# Patient Record
Sex: MALE | Race: BLACK OR AFRICAN AMERICAN | URBAN - METROPOLITAN AREA
[De-identification: names, ages, dates, MRNs, and addresses within clinical notes are randomized per-mention and may not be internally consistent; named-entity substitution may affect disease eponyms.]

---

## 2018-06-04 ENCOUNTER — DOCTOR'S OFFICE (OUTPATIENT)
Dept: URBAN - METROPOLITAN AREA CLINIC 136 | Facility: CLINIC | Age: 43
Setting detail: OPHTHALMOLOGY
End: 2018-06-04
Payer: COMMERCIAL

## 2018-06-04 DIAGNOSIS — E11.9: ICD-10-CM

## 2018-06-04 DIAGNOSIS — H25.13: ICD-10-CM

## 2018-06-04 DIAGNOSIS — Z79.84: ICD-10-CM

## 2018-06-04 DIAGNOSIS — H04.123: ICD-10-CM

## 2018-06-04 PROCEDURE — 92004 COMPRE OPH EXAM NEW PT 1/>: CPT | Performed by: OPHTHALMOLOGY

## 2018-06-04 ASSESSMENT — REFRACTION_CURRENTRX
OD_OVR_VA: 20/
OS_OVR_VA: 20/
OS_OVR_VA: 20/
OD_OVR_VA: 20/
OS_OVR_VA: 20/
OD_OVR_VA: 20/

## 2018-06-04 ASSESSMENT — REFRACTION_MANIFEST
OD_VA2: 20/
OD_VA3: 20/
OS_VA2: 20/
OD_VA1: 20/
OD_VA3: 20/
OD_VA3: 20/
OU_VA: 20/
OD_VA1: 20/
OS_VA3: 20/
OS_VA1: 20/
OD_VA2: 20/
OS_VA2: 20/
OU_VA: 20/
OS_VA1: 20/
OS_VA3: 20/
OS_VA1: 20/
OD_VA1: 20/
OS_VA2: 20/
OS_VA3: 20/
OD_VA2: 20/
OU_VA: 20/

## 2018-06-04 ASSESSMENT — REFRACTION_AUTOREFRACTION
OS_SPHERE: -0.75
OS_AXIS: 180
OS_CYLINDER: -0.50
OD_CYLINDER: -0.50
OD_SPHERE: -0.50
OD_AXIS: 164

## 2018-06-04 ASSESSMENT — LID EXAM ASSESSMENTS
OS_COMMENTS: MGD
OD_COMMENTS: MGD

## 2018-06-04 ASSESSMENT — CONFRONTATIONAL VISUAL FIELD TEST (CVF)
OD_FINDINGS: FULL
OS_FINDINGS: FULL

## 2018-06-04 ASSESSMENT — VISUAL ACUITY
OD_BCVA: 20/20
OS_BCVA: 20/20

## 2018-06-04 ASSESSMENT — SUPERFICIAL PUNCTATE KERATITIS (SPK)
OS_SPK: 1+
OD_SPK: 1+

## 2018-06-04 ASSESSMENT — SPHEQUIV_DERIVED
OS_SPHEQUIV: -1
OD_SPHEQUIV: -0.75

## 2019-08-07 ENCOUNTER — DOCTOR'S OFFICE (OUTPATIENT)
Dept: URBAN - METROPOLITAN AREA CLINIC 136 | Facility: CLINIC | Age: 44
Setting detail: OPHTHALMOLOGY
End: 2019-08-07
Payer: COMMERCIAL

## 2019-08-07 DIAGNOSIS — H25.13: ICD-10-CM

## 2019-08-07 DIAGNOSIS — G45.3: ICD-10-CM

## 2019-08-07 DIAGNOSIS — H04.123: ICD-10-CM

## 2019-08-07 DIAGNOSIS — E11.9: ICD-10-CM

## 2019-08-07 DIAGNOSIS — Z79.84: ICD-10-CM

## 2019-08-07 DIAGNOSIS — H53.10: ICD-10-CM

## 2019-08-07 PROCEDURE — 92014 COMPRE OPH EXAM EST PT 1/>: CPT | Performed by: OPTOMETRIST

## 2019-08-07 ASSESSMENT — REFRACTION_AUTOREFRACTION
OD_SPHERE: -0.75
OS_SPHERE: -0.50
OD_AXIS: 172
OS_CYLINDER: -0.50
OD_CYLINDER: -1.00
OS_AXIS: 012

## 2019-08-07 ASSESSMENT — LID EXAM ASSESSMENTS
OD_COMMENTS: MGD
OS_COMMENTS: MGD

## 2019-08-07 ASSESSMENT — REFRACTION_MANIFEST
OS_VA1: 20/
OS_VA3: 20/
OU_VA: 20/
OS_VA3: 20/
OD_VA3: 20/
OD_VA1: 20/
OS_VA2: 20/
OD_VA2: 20/
OD_VA3: 20/
OU_VA: 20/
OD_VA1: 20/
OS_VA2: 20/
OD_VA2: 20/
OS_VA1: 20/

## 2019-08-07 ASSESSMENT — REFRACTION_CURRENTRX
OD_OVR_VA: 20/
OS_OVR_VA: 20/
OD_OVR_VA: 20/
OS_OVR_VA: 20/
OS_OVR_VA: 20/
OD_OVR_VA: 20/

## 2019-08-07 ASSESSMENT — SUPERFICIAL PUNCTATE KERATITIS (SPK)
OS_SPK: ABSENT
OD_SPK: ABSENT

## 2019-08-07 ASSESSMENT — VISUAL ACUITY
OD_BCVA: 20/30+1
OS_BCVA: 20/20-2

## 2019-08-07 ASSESSMENT — CONFRONTATIONAL VISUAL FIELD TEST (CVF)
OD_FINDINGS: FULL
OS_FINDINGS: FULL

## 2019-08-07 ASSESSMENT — KERATOMETRY
OS_AXISANGLE_DEGREES: 096
OD_K1POWER_DIOPTERS: 41.75
OS_K2POWER_DIOPTERS: 42.75
OS_K1POWER_DIOPTERS: 42.00
OD_K2POWER_DIOPTERS: 42.75
OD_AXISANGLE_DEGREES: 077

## 2019-08-07 ASSESSMENT — AXIALLENGTH_DERIVED
OD_AL: 24.5776
OS_AL: 24.3191

## 2019-08-07 ASSESSMENT — SPHEQUIV_DERIVED
OD_SPHEQUIV: -1.25
OS_SPHEQUIV: -0.75

## 2019-09-11 ENCOUNTER — DOCTOR'S OFFICE (OUTPATIENT)
Dept: URBAN - METROPOLITAN AREA CLINIC 136 | Facility: CLINIC | Age: 44
Setting detail: OPHTHALMOLOGY
End: 2019-09-11
Payer: COMMERCIAL

## 2019-09-11 DIAGNOSIS — E11.9: ICD-10-CM

## 2019-09-11 DIAGNOSIS — H04.123: ICD-10-CM

## 2019-09-11 DIAGNOSIS — G45.3: ICD-10-CM

## 2019-09-11 DIAGNOSIS — H53.10: ICD-10-CM

## 2019-09-11 PROBLEM — H25.13 CATARACT NUCLEAR SCLEROSIS ; BOTH EYES: Status: ACTIVE | Noted: 2018-06-04

## 2019-09-11 PROCEDURE — 92014 COMPRE OPH EXAM EST PT 1/>: CPT | Performed by: OPHTHALMOLOGY

## 2019-09-11 ASSESSMENT — REFRACTION_AUTOREFRACTION
OS_AXIS: 012
OD_SPHERE: -0.75
OS_SPHERE: -0.50
OD_AXIS: 172
OD_CYLINDER: -1.00
OS_CYLINDER: -0.50

## 2019-09-11 ASSESSMENT — CONFRONTATIONAL VISUAL FIELD TEST (CVF)
OS_FINDINGS: FULL
OD_FINDINGS: FULL

## 2019-09-11 ASSESSMENT — REFRACTION_MANIFEST
OS_VA2: 20/
OS_VA1: 20/
OS_VA3: 20/
OD_VA3: 20/
OU_VA: 20/
OD_VA2: 20/
OU_VA: 20/
OS_VA1: 20/
OD_VA1: 20/
OD_VA2: 20/
OS_VA3: 20/
OD_VA3: 20/
OS_VA2: 20/
OD_VA1: 20/

## 2019-09-11 ASSESSMENT — SUPERFICIAL PUNCTATE KERATITIS (SPK)
OD_SPK: ABSENT
OS_SPK: ABSENT

## 2019-09-11 ASSESSMENT — REFRACTION_CURRENTRX
OD_OVR_VA: 20/
OS_OVR_VA: 20/
OD_OVR_VA: 20/
OD_OVR_VA: 20/
OS_OVR_VA: 20/
OS_OVR_VA: 20/

## 2019-09-11 ASSESSMENT — SPHEQUIV_DERIVED
OD_SPHEQUIV: -1.25
OS_SPHEQUIV: -0.75

## 2019-09-11 ASSESSMENT — LID EXAM ASSESSMENTS
OS_COMMENTS: MGD
OD_COMMENTS: MGD

## 2019-09-11 ASSESSMENT — VISUAL ACUITY
OD_BCVA: 20/20-1
OS_BCVA: 20/25-2

## 2023-01-17 ENCOUNTER — OFFICE VISIT (OUTPATIENT)
Dept: URGENT CARE | Age: 48
End: 2023-01-17

## 2023-01-17 VITALS
WEIGHT: 300 LBS | SYSTOLIC BLOOD PRESSURE: 188 MMHG | TEMPERATURE: 97.7 F | HEIGHT: 70 IN | DIASTOLIC BLOOD PRESSURE: 107 MMHG | BODY MASS INDEX: 42.95 KG/M2 | HEART RATE: 97 BPM | RESPIRATION RATE: 18 BRPM | OXYGEN SATURATION: 97 %

## 2023-01-17 DIAGNOSIS — M25.519 ACUTE SHOULDER PAIN, UNSPECIFIED LATERALITY: Primary | ICD-10-CM

## 2023-01-17 RX ORDER — WARFARIN SODIUM 7.5 MG/1
7.5 TABLET ORAL DAILY
COMMUNITY
Start: 2022-12-28

## 2023-01-17 RX ORDER — KETOROLAC TROMETHAMINE 30 MG/ML
30 INJECTION, SOLUTION INTRAMUSCULAR; INTRAVENOUS ONCE
Status: COMPLETED | OUTPATIENT
Start: 2023-01-17 | End: 2023-01-17

## 2023-01-17 RX ORDER — PREDNISONE 10 MG/1
10 TABLET ORAL DAILY
Qty: 21 TABLET | Refills: 0 | Status: SHIPPED | OUTPATIENT
Start: 2023-01-17

## 2023-01-17 RX ADMIN — KETOROLAC TROMETHAMINE 30 MG: 30 INJECTION, SOLUTION INTRAMUSCULAR; INTRAVENOUS at 20:13

## 2023-01-18 NOTE — PROGRESS NOTES
[facility]        NAME: Yulissa Powell is a 56 y o  male  : 1975    MRN: [id number]  DATE: 2023  TIME: 8:01 PM    Assessment and Plan   Acute shoulder pain, unspecified laterality [M25 [id number]]  1  Acute shoulder pain, unspecified laterality  ketorolac (TORADOL) injection 30 mg    predniSONE 10 mg tablet    Ambulatory Referral to Orthopedic Surgery            Patient Instructions       Follow up with PCP in 3-5 days  Proceed to  ER if symptoms worsen  Chief Complaint     Chief Complaint   Patient presents with   • Shoulder Pain     Pain since yesterday morning; pt woke up with pain  Pt has dislocated shoulder previously multiple times years ago  States that he does not know what he did to cause the pain  Taking tylenol for pain  On warfarin  History of Present Illness       HPI  Patient presents complaining of shoulder pain ongoing since yesterday morning when he woke up  Patient recently dislocated shoulder multiple years ago  Patient states he does not know if he injured himself but does not think so, he just has the pain  Has been taking Tylenol and for pain  Patient is on a blood thinner    Review of Systems   Review of Systems    Per HPI  Current Medications       Current Outpatient Medications:   •  predniSONE 10 mg tablet, Take 1 tablet (10 mg total) by mouth daily 6 tab day 1, 5 tab day 2, 4 tab day 3, 3 tab day 4, 2 tab day 5, 1 tab day 6, Disp: 21 tablet, Rfl: 0  •  warfarin (COUMADIN) 7 5 mg tablet, Take 7 5 mg by mouth daily, Disp: , Rfl:     Current Facility-Administered Medications:   •  ketorolac (TORADOL) injection 30 mg, 30 mg, Intramuscular, Once, Trino Ingram MD    Current Allergies     Allergies as of 2023 - Reviewed 2023   Allergen Reaction Noted   • Bee pollen Hives 2012            The following portions of the patient's history were reviewed and updated as appropriate: allergies, current medications, past family history, past medical history, past social history, past surgical history and problem list      No past medical history on file  No past surgical history on file  No family history on file  Medications have been verified  Objective   BP (!) 188/107   Pulse 97   Temp 97 7 °F (36 5 °C)   Resp 18   Ht 5' 10" (1 778 m)   Wt 136 kg (300 lb)   SpO2 97%   BMI 43 05 kg/m²   No LMP for male patient  Physical Exam     Physical Exam  Constitutional:       General: He is not in acute distress  Appearance: He is well-developed  He is not diaphoretic  HENT:      Head: Normocephalic and atraumatic  Cardiovascular:      Rate and Rhythm: Normal rate and regular rhythm  Heart sounds: Normal heart sounds  Pulmonary:      Effort: Pulmonary effort is normal  No respiratory distress  Breath sounds: Normal breath sounds  No wheezing  Musculoskeletal:         General: Tenderness present  Comments: Range of motion intact at the right shoulder joint, tenderness to palpation over the anterior part of the joint,   Neurological:      Mental Status: He is alert and oriented to person, place, and time

## 2023-01-19 ENCOUNTER — HOSPITAL ENCOUNTER (OUTPATIENT)
Dept: RADIOLOGY | Facility: HOSPITAL | Age: 48
Discharge: HOME | End: 2023-01-19

## 2023-01-19 VITALS
BODY MASS INDEX: 43.58 KG/M2 | WEIGHT: 304.4 LBS | HEART RATE: 84 BPM | SYSTOLIC BLOOD PRESSURE: 205 MMHG | OXYGEN SATURATION: 97 % | HEIGHT: 70 IN | DIASTOLIC BLOOD PRESSURE: 117 MMHG

## 2023-01-19 DIAGNOSIS — M25.511 RIGHT SHOULDER PAIN, UNSPECIFIED CHRONICITY: Primary | ICD-10-CM

## 2023-01-19 DIAGNOSIS — M75.41 IMPINGEMENT SYNDROME OF RIGHT SHOULDER: ICD-10-CM

## 2023-01-19 DIAGNOSIS — M25.511 RIGHT SHOULDER PAIN, UNSPECIFIED CHRONICITY: ICD-10-CM

## 2023-01-19 DIAGNOSIS — M25.519 ACUTE SHOULDER PAIN, UNSPECIFIED LATERALITY: ICD-10-CM

## 2023-01-19 RX ORDER — EZETIMIBE AND SIMVASTATIN 10; 10 MG/1; MG/1
TABLET ORAL
COMMUNITY
Start: 2011-07-05

## 2023-01-19 RX ORDER — TRIAMCINOLONE ACETONIDE 40 MG/ML
80 INJECTION, SUSPENSION INTRA-ARTICULAR; INTRAMUSCULAR
Status: COMPLETED | OUTPATIENT
Start: 2023-01-19 | End: 2023-01-19

## 2023-01-19 RX ORDER — LIDOCAINE HYDROCHLORIDE 10 MG/ML
2 INJECTION, SOLUTION INFILTRATION; PERINEURAL
Status: COMPLETED | OUTPATIENT
Start: 2023-01-19 | End: 2023-01-19

## 2023-01-19 RX ORDER — BUPIVACAINE HYDROCHLORIDE 5 MG/ML
2 INJECTION, SOLUTION EPIDURAL; INTRACAUDAL
Status: COMPLETED | OUTPATIENT
Start: 2023-01-19 | End: 2023-01-19

## 2023-01-19 RX ORDER — EPINEPHRINE 0.3 MG/.3ML
INJECTION SUBCUTANEOUS
COMMUNITY
Start: 2011-05-26

## 2023-01-19 RX ADMIN — TRIAMCINOLONE ACETONIDE 80 MG: 40 INJECTION, SUSPENSION INTRA-ARTICULAR; INTRAMUSCULAR at 18:58

## 2023-01-19 RX ADMIN — BUPIVACAINE HYDROCHLORIDE 2 ML: 5 INJECTION, SOLUTION EPIDURAL; INTRACAUDAL at 18:58

## 2023-01-19 RX ADMIN — LIDOCAINE HYDROCHLORIDE 2 ML: 10 INJECTION, SOLUTION INFILTRATION; PERINEURAL at 18:58

## 2023-01-19 NOTE — PROGRESS NOTES
Assessment/Plan   Diagnoses and all orders for this visit:        Acute shoulder pain, right    Right shoulder impingement  - Cortisone injection today, subacromia  - Ice, Tylenol as needed  - Start PT  - Follow up with Dr Anca Pizarro in 4-6 weeks    Hypertension (205/117)  - Take your prescribed HTN meds today  - Go to the ED immediately with any new symptoms including headache, blurred vision, chest pain, palpitations, stroke symptoms, dizziness  - Call your PCP today to schedule f/u appt            Subjective   Patient ID: Zayda Mckeon is a 56 y o  male  Vitals:    01/19/23 1820   BP: (!) 205/117   Pulse: 84   SpO2: 97%     48yo male comes in for an evaluation of his right shoulder  He woke up with pain on 1/16/23 that worsened the next day  No injury  No fevers or chills  He went to urgent care and was treated with prednisone  This has been helpful  The pain is located in the lateral shoulder, anterior shoulder, and upper trap  It increases with shoulder motion  The pain is dull in character, mild in severity, pain does not radiate and is not associated with numbness  He has a history of multiple dislocations in the past       Incidentally noted is hypertension  They will call their PCP today  He has his htn medication in the car with him now, but he did not take it today  He will do that now  He understands and agrees to go to the ER immediately with any symptoms including chest pain, palpitations, sob, headache, dizziness, blurred vision, or any other symptom  He will f/u with his PCP          The following portions of the patient's history were reviewed and updated as appropriate: allergies, current medications, past family history, past medical history, past social history, past surgical history and problem list     Review of Systems  Ortho Exam  Past Medical History:   Diagnosis Date   • Atrial fibrillation ([facility] [address] )    • Diabetes mellitus ([facility] [address] )    • Hyperlipidemia    • Hypertension    • Obesity • Stroke [facility])      History reviewed  No pertinent surgical history  History reviewed  No pertinent family history  Social History     Occupational History   • Not on file   Tobacco Use   • Smoking status: Never   • Smokeless tobacco: Never   Vaping Use   • Vaping Use: Never used   Substance and Sexual Activity   • Alcohol use: Not on file   • Drug use: Not on file   • Sexual activity: Not on file       Review of Systems   Constitutional: Negative  HENT: Negative  Eyes: Negative  Respiratory: Negative  Cardiovascular: Negative  Gastrointestinal: Negative  Endocrine: Negative  Genitourinary: Negative  Musculoskeletal: As below      Allergic/Immunologic: Negative  Neurological: Negative  Hematological: Negative  Psychiatric/Behavioral: Negative  Objective   Physical Exam        I have personally reviewed pertinent films in PACS and my interpretation is no acute displaced fracture       · Constitutional: Awake, Alert, Oriented  · Eyes: EOMI  · Psych: Mood and affect appropriate  · Heart: regular rate   · Lungs: No audible wheezing  · Abdomen: No guarding  · Lymph: no lymphedema  · Neuro: right sided numbness at baseline - unchanged today      • right Shoulder:  - Appearance  • No swelling, discoloration, deformity, or ecchymosis  - Palpation  • No tenderness to palpation of the clavicle, AC joint, biceps tendon, scapula, or proximal humerus  - ROM  • Flexion: 150 with pain, ER: 90 and IR: L3  - Motor  • Internal and external rotation 5/5 with shoulder at side, flexion 5/5  - Special tests  • Empty Can Test negative, Drop Arm Test negative and Hawkin's Impingement Test positive  • No pain with cspine ROM  - NVI distally except: right sided numbness at baseline    Large joint arthrocentesis: R subacromial bursa  Universal Protocol:  Consent: Verbal consent obtained    Risks and benefits: risks, benefits and alternatives were discussed  Consent given by: patient  Time out: Immediately prior to procedure a "time out" was called to verify the correct patient, procedure, equipment, support staff and site/side marked as required  Timeout called at: 1/19/2023 6:53 PM   Patient understanding: patient states understanding of the procedure being performed  Site marked: the operative site was marked  Radiology Images displayed and confirmed   If images not available, report reviewed: imaging studies available  Patient identity confirmed: verbally with patient    Supporting Documentation  Indications: pain   Procedure Details  Location: shoulder - R subacromial bursa  Needle size: 22 G  Ultrasound guidance: no  Approach: posterior  Medications administered: 2 mL lidocaine 1 %; 2 mL bupivacaine (PF) 0 5 %; 80 mg triamcinolone acetonide 40 mg/mL    Patient tolerance: patient tolerated the procedure well with no immediate complications  Dressing:  Sterile dressing applied

## 2024-11-26 ENCOUNTER — APPOINTMENT (OUTPATIENT)
Dept: NON INVASIVE DIAGNOSTICS | Facility: HOSPITAL | Age: 49
End: 2024-11-26
Payer: COMMERCIAL

## 2024-11-26 ENCOUNTER — APPOINTMENT (OUTPATIENT)
Dept: MRI IMAGING | Facility: HOSPITAL | Age: 49
End: 2024-11-26
Payer: COMMERCIAL

## 2024-11-26 ENCOUNTER — HOSPITAL ENCOUNTER (OUTPATIENT)
Facility: HOSPITAL | Age: 49
Setting detail: OBSERVATION
Discharge: HOME | End: 2024-11-28
Attending: EMERGENCY MEDICINE | Admitting: INTERNAL MEDICINE
Payer: COMMERCIAL

## 2024-11-26 DIAGNOSIS — I16.0 HYPERTENSIVE URGENCY: ICD-10-CM

## 2024-11-26 DIAGNOSIS — E03.9 HYPOTHYROIDISM: ICD-10-CM

## 2024-11-26 DIAGNOSIS — R29.90 STROKE-LIKE SYMPTOMS: Primary | ICD-10-CM

## 2024-11-26 DIAGNOSIS — E11.65 TYPE 2 DIABETES MELLITUS WITH HYPERGLYCEMIA, WITHOUT LONG-TERM CURRENT USE OF INSULIN (HCC): ICD-10-CM

## 2024-11-26 DIAGNOSIS — I48.0 PAROXYSMAL ATRIAL FIBRILLATION (HCC): ICD-10-CM

## 2024-11-26 DIAGNOSIS — I63.432 CEREBROVASCULAR ACCIDENT (CVA) DUE TO EMBOLISM OF LEFT POSTERIOR CEREBRAL ARTERY (HCC): ICD-10-CM

## 2024-11-26 DIAGNOSIS — I10 HYPERTENSION: ICD-10-CM

## 2024-11-26 PROBLEM — E66.01 MORBID (SEVERE) OBESITY DUE TO EXCESS CALORIES (HCC): Status: ACTIVE | Noted: 2021-04-21

## 2024-11-26 PROBLEM — Z91.199 HISTORY OF NONCOMPLIANCE WITH MEDICAL TREATMENT: Status: ACTIVE | Noted: 2021-12-20

## 2024-11-26 PROBLEM — G47.33 OBSTRUCTIVE SLEEP APNEA: Status: ACTIVE | Noted: 2021-04-21

## 2024-11-26 PROBLEM — E78.5 HYPERLIPIDEMIA: Status: ACTIVE | Noted: 2018-01-03

## 2024-11-26 LAB
2HR DELTA HS TROPONIN: 1 NG/L
4HR DELTA HS TROPONIN: 1 NG/L
ANION GAP SERPL CALCULATED.3IONS-SCNC: 8 MMOL/L (ref 4–13)
AORTIC ROOT: 4 CM
AORTIC VALVE ANNULUS: 2.7 CM
APICAL FOUR CHAMBER EJECTION FRACTION: 44 %
APTT PPP: 29 SECONDS (ref 23–34)
ASCENDING AORTA: 3.6 CM
ATRIAL RATE: 84 BPM
BSA FOR ECHO PROCEDURE: 2.47 M2
BUN SERPL-MCNC: 21 MG/DL (ref 5–25)
CALCIUM SERPL-MCNC: 9 MG/DL (ref 8.4–10.2)
CARDIAC TROPONIN I PNL SERPL HS: 8 NG/L (ref ?–50)
CARDIAC TROPONIN I PNL SERPL HS: 9 NG/L (ref ?–50)
CARDIAC TROPONIN I PNL SERPL HS: 9 NG/L (ref ?–50)
CHLORIDE SERPL-SCNC: 104 MMOL/L (ref 96–108)
CO2 SERPL-SCNC: 23 MMOL/L (ref 21–32)
CREAT SERPL-MCNC: 1.17 MG/DL (ref 0.6–1.3)
E WAVE DECELERATION TIME: 152 MS
E/A RATIO: 1.23
ERYTHROCYTE [DISTWIDTH] IN BLOOD BY AUTOMATED COUNT: 13.4 % (ref 11.6–15.1)
FRACTIONAL SHORTENING: 27 (ref 28–44)
GFR SERPL CREATININE-BSD FRML MDRD: 73 ML/MIN/1.73SQ M
GLUCOSE SERPL-MCNC: 150 MG/DL (ref 65–140)
GLUCOSE SERPL-MCNC: 162 MG/DL (ref 65–140)
GLUCOSE SERPL-MCNC: 183 MG/DL (ref 65–140)
GLUCOSE SERPL-MCNC: 274 MG/DL (ref 65–140)
GLUCOSE SERPL-MCNC: 282 MG/DL (ref 65–140)
HCT VFR BLD AUTO: 44.1 % (ref 36.5–49.3)
HGB BLD-MCNC: 14.6 G/DL (ref 12–17)
INR PPP: 1.03 (ref 0.85–1.19)
INTERVENTRICULAR SEPTUM IN DIASTOLE (PARASTERNAL SHORT AXIS VIEW): 1.5 CM
INTERVENTRICULAR SEPTUM: 1.5 CM (ref 0.6–1.1)
LAAS-AP2: 16.4 CM2
LAAS-AP4: 19.2 CM2
LEFT ATRIUM SIZE: 5 CM
LEFT ATRIUM VOLUME (MOD BIPLANE): 53 ML
LEFT ATRIUM VOLUME INDEX (MOD BIPLANE): 21.5 ML/M2
LEFT INTERNAL DIMENSION IN SYSTOLE: 3.6 CM (ref 2.1–4)
LEFT VENTRICLE DIASTOLIC VOLUME (MOD BIPLANE): 192 ML
LEFT VENTRICLE DIASTOLIC VOLUME INDEX (MOD BIPLANE): 77.7 ML/M2
LEFT VENTRICLE SYSTOLIC VOLUME (MOD BIPLANE): 107 ML
LEFT VENTRICLE SYSTOLIC VOLUME INDEX (MOD BIPLANE): 43.3 ML/M2
LEFT VENTRICULAR INTERNAL DIMENSION IN DIASTOLE: 4.9 CM (ref 3.5–6)
LEFT VENTRICULAR POSTERIOR WALL IN END DIASTOLE: 1.9 CM
LEFT VENTRICULAR STROKE VOLUME: 56 ML
LV EF: 44 %
LVSV (TEICH): 56 ML
MCH RBC QN AUTO: 29 PG (ref 26.8–34.3)
MCHC RBC AUTO-ENTMCNC: 33.1 G/DL (ref 31.4–37.4)
MCV RBC AUTO: 88 FL (ref 82–98)
MV E'TISSUE VEL-LAT: 9 CM/S
MV E'TISSUE VEL-SEP: 6 CM/S
MV PEAK A VEL: 0.73 M/S
MV PEAK E VEL: 90 CM/S
MV STENOSIS PRESSURE HALF TIME: 44 MS
MV VALVE AREA P 1/2 METHOD: 5
P AXIS: 67 DEGREES
PLATELET # BLD AUTO: 215 THOUSANDS/UL (ref 149–390)
PMV BLD AUTO: 10.4 FL (ref 8.9–12.7)
POTASSIUM SERPL-SCNC: 3.8 MMOL/L (ref 3.5–5.3)
PR INTERVAL: 156 MS
PROTHROMBIN TIME: 14.2 SECONDS (ref 12.3–15)
QRS AXIS: -47 DEGREES
QRSD INTERVAL: 120 MS
QT INTERVAL: 378 MS
QTC INTERVAL: 446 MS
RA PRESSURE ESTIMATED: 5 MMHG
RBC # BLD AUTO: 5.04 MILLION/UL (ref 3.88–5.62)
RIGHT ATRIUM AREA SYSTOLE A4C: 17.2 CM2
RIGHT VENTRICLE ID DIMENSION: 4.9 CM
SINOTUBULAR JUNCTION: 3.8 CM
SL CV LEFT ATRIUM LENGTH A2C: 5.1 CM
SL CV LV EF: 50
SL CV PED ECHO LEFT VENTRICLE DIASTOLIC VOLUME (MOD BIPLANE) 2D: 111 ML
SL CV PED ECHO LEFT VENTRICLE SYSTOLIC VOLUME (MOD BIPLANE) 2D: 55 ML
SL CV SINUS OF VALSALVA 2D: 4.1 CM
SODIUM SERPL-SCNC: 135 MMOL/L (ref 135–147)
STJ: 3.8 CM
T WAVE AXIS: 67 DEGREES
TRICUSPID ANNULAR PLANE SYSTOLIC EXCURSION: 2.1 CM
TSH SERPL DL<=0.05 MIU/L-ACNC: 1.91 UIU/ML (ref 0.45–4.5)
VENTRICULAR RATE: 84 BPM
WBC # BLD AUTO: 7.62 THOUSAND/UL (ref 4.31–10.16)

## 2024-11-26 PROCEDURE — 82948 REAGENT STRIP/BLOOD GLUCOSE: CPT

## 2024-11-26 PROCEDURE — 93005 ELECTROCARDIOGRAM TRACING: CPT

## 2024-11-26 PROCEDURE — 93306 TTE W/DOPPLER COMPLETE: CPT | Performed by: INTERNAL MEDICINE

## 2024-11-26 PROCEDURE — 99223 1ST HOSP IP/OBS HIGH 75: CPT | Performed by: INTERNAL MEDICINE

## 2024-11-26 PROCEDURE — 93010 ELECTROCARDIOGRAM REPORT: CPT | Performed by: INTERNAL MEDICINE

## 2024-11-26 PROCEDURE — 70551 MRI BRAIN STEM W/O DYE: CPT

## 2024-11-26 PROCEDURE — 84443 ASSAY THYROID STIM HORMONE: CPT

## 2024-11-26 PROCEDURE — 85610 PROTHROMBIN TIME: CPT | Performed by: EMERGENCY MEDICINE

## 2024-11-26 PROCEDURE — 80048 BASIC METABOLIC PNL TOTAL CA: CPT | Performed by: EMERGENCY MEDICINE

## 2024-11-26 PROCEDURE — 99285 EMERGENCY DEPT VISIT HI MDM: CPT | Performed by: EMERGENCY MEDICINE

## 2024-11-26 PROCEDURE — 84484 ASSAY OF TROPONIN QUANT: CPT | Performed by: EMERGENCY MEDICINE

## 2024-11-26 PROCEDURE — 99285 EMERGENCY DEPT VISIT HI MDM: CPT

## 2024-11-26 PROCEDURE — 36415 COLL VENOUS BLD VENIPUNCTURE: CPT | Performed by: EMERGENCY MEDICINE

## 2024-11-26 PROCEDURE — 85027 COMPLETE CBC AUTOMATED: CPT | Performed by: EMERGENCY MEDICINE

## 2024-11-26 PROCEDURE — 99205 OFFICE O/P NEW HI 60 MIN: CPT | Performed by: PSYCHIATRY & NEUROLOGY

## 2024-11-26 PROCEDURE — 93306 TTE W/DOPPLER COMPLETE: CPT

## 2024-11-26 PROCEDURE — 85730 THROMBOPLASTIN TIME PARTIAL: CPT | Performed by: EMERGENCY MEDICINE

## 2024-11-26 RX ORDER — INSULIN LISPRO 100 [IU]/ML
2-12 INJECTION, SOLUTION INTRAVENOUS; SUBCUTANEOUS
Status: DISCONTINUED | OUTPATIENT
Start: 2024-11-26 | End: 2024-11-28 | Stop reason: HOSPADM

## 2024-11-26 RX ORDER — ATORVASTATIN CALCIUM 80 MG/1
80 TABLET, FILM COATED ORAL DAILY
Status: ON HOLD | COMMUNITY
End: 2024-11-28

## 2024-11-26 RX ORDER — DILTIAZEM HYDROCHLORIDE 240 MG/1
240 CAPSULE, COATED, EXTENDED RELEASE ORAL DAILY
COMMUNITY
End: 2024-11-28

## 2024-11-26 RX ORDER — LEVOTHYROXINE SODIUM 50 UG/1
50 TABLET ORAL DAILY
Status: ON HOLD | COMMUNITY
End: 2024-11-28

## 2024-11-26 RX ORDER — DILTIAZEM HYDROCHLORIDE 120 MG/1
120 CAPSULE, COATED, EXTENDED RELEASE ORAL DAILY
Status: DISCONTINUED | OUTPATIENT
Start: 2024-11-26 | End: 2024-11-28 | Stop reason: HOSPADM

## 2024-11-26 RX ORDER — ATORVASTATIN CALCIUM 40 MG/1
80 TABLET, FILM COATED ORAL DAILY
Status: DISCONTINUED | OUTPATIENT
Start: 2024-11-26 | End: 2024-11-28 | Stop reason: HOSPADM

## 2024-11-26 RX ORDER — ACETAMINOPHEN 325 MG/1
650 TABLET ORAL EVERY 6 HOURS PRN
Status: DISCONTINUED | OUTPATIENT
Start: 2024-11-26 | End: 2024-11-28 | Stop reason: HOSPADM

## 2024-11-26 RX ORDER — ASPIRIN 81 MG/1
81 TABLET ORAL DAILY
Status: DISCONTINUED | OUTPATIENT
Start: 2024-11-26 | End: 2024-11-28 | Stop reason: HOSPADM

## 2024-11-26 RX ORDER — AMLODIPINE BESYLATE 10 MG/1
10 TABLET ORAL DAILY
COMMUNITY
End: 2024-11-28

## 2024-11-26 RX ORDER — LISINOPRIL 40 MG/1
40 TABLET ORAL DAILY
Status: ON HOLD | COMMUNITY
End: 2024-11-28

## 2024-11-26 RX ORDER — POLYETHYLENE GLYCOL 3350 17 G/17G
17 POWDER, FOR SOLUTION ORAL DAILY
Status: DISCONTINUED | OUTPATIENT
Start: 2024-11-26 | End: 2024-11-28 | Stop reason: HOSPADM

## 2024-11-26 RX ORDER — LISINOPRIL 20 MG/1
40 TABLET ORAL DAILY
Status: DISCONTINUED | OUTPATIENT
Start: 2024-11-26 | End: 2024-11-28 | Stop reason: HOSPADM

## 2024-11-26 RX ORDER — LEVOTHYROXINE SODIUM 50 UG/1
50 TABLET ORAL DAILY
Status: DISCONTINUED | OUTPATIENT
Start: 2024-11-26 | End: 2024-11-28 | Stop reason: HOSPADM

## 2024-11-26 RX ORDER — HYDRALAZINE HYDROCHLORIDE 20 MG/ML
10 INJECTION INTRAMUSCULAR; INTRAVENOUS EVERY 6 HOURS PRN
Status: DISCONTINUED | OUTPATIENT
Start: 2024-11-26 | End: 2024-11-28 | Stop reason: HOSPADM

## 2024-11-26 RX ADMIN — ASPIRIN 81 MG: 81 TABLET, COATED ORAL at 12:24

## 2024-11-26 RX ADMIN — PERFLUTREN 0.6 ML/MIN: 6.52 INJECTION, SUSPENSION INTRAVENOUS at 15:00

## 2024-11-26 RX ADMIN — APIXABAN 5 MG: 5 TABLET, FILM COATED ORAL at 18:22

## 2024-11-26 RX ADMIN — LEVOTHYROXINE SODIUM 50 MCG: 50 TABLET ORAL at 12:24

## 2024-11-26 RX ADMIN — IOHEXOL 85 ML: 350 INJECTION, SOLUTION INTRAVENOUS at 07:33

## 2024-11-26 RX ADMIN — ATORVASTATIN CALCIUM 80 MG: 40 TABLET, FILM COATED ORAL at 12:23

## 2024-11-26 RX ADMIN — INSULIN LISPRO 2 UNITS: 100 INJECTION, SOLUTION INTRAVENOUS; SUBCUTANEOUS at 12:21

## 2024-11-26 RX ADMIN — APIXABAN 5 MG: 5 TABLET, FILM COATED ORAL at 12:24

## 2024-11-26 RX ADMIN — DILTIAZEM HYDROCHLORIDE 120 MG: 120 CAPSULE, COATED, EXTENDED RELEASE ORAL at 12:22

## 2024-11-26 RX ADMIN — INSULIN LISPRO 2 UNITS: 100 INJECTION, SOLUTION INTRAVENOUS; SUBCUTANEOUS at 17:00

## 2024-11-26 RX ADMIN — LISINOPRIL 40 MG: 10 TABLET ORAL at 12:23

## 2024-11-26 NOTE — ASSESSMENT & PLAN NOTE
Patient has not taken medications for 3 months, states difficulty with make it to his PCP appointments due to work restrictions.    Plan:  Case management consulted

## 2024-11-26 NOTE — ED NOTES
Patient ambulated to the bathroom. Patients gait veers to the left. Patient steady on feet. Ambulated about 20 feet.        Mya Lyons RN  11/26/24 9114

## 2024-11-26 NOTE — ASSESSMENT & PLAN NOTE
"Estimated body mass index is 42.58 kg/m² as calculated from the following:    Height as of this encounter: 5' 10\" (1.778 m).    Weight as of this encounter: 135 kg (296 lb 11.8 oz).   Body mass index is 42.58 kg/m².    Recommend incorporating a more whole foods plant-predominant diet along with decreasing consumption of red meats and processed foods  Per AHA guidelines, recommend moderate-vigorous intensity exercise for 30 minutes a day for 5 days a week or a total of 150 min/week   "

## 2024-11-26 NOTE — PLAN OF CARE
Problem: SAFETY ADULT  Goal: Patient will remain free of falls  Description: INTERVENTIONS:  - Educate patient/family on patient safety including physical limitations  - Instruct patient to call for assistance with activity   - Consult OT/PT to assist with strengthening/mobility   - Keep Call bell within reach  - Keep bed low and locked with side rails adjusted as appropriate  - Keep care items and personal belongings within reach  - Initiate and maintain comfort rounds  - Make Fall Risk Sign visible to staff  - Offer Toileting every 2 Hours, in advance of need  - Initiate/Maintain bed alarm  - Obtain necessary fall risk management equipment  - Apply yellow socks and bracelet for high fall risk patients  - Consider moving patient to room near nurses station  Outcome: Progressing     Problem: Neurological Deficit  Goal: Neurological status is stable or improving  Description: Interventions:  - Monitor and assess patient's level of consciousness, motor function, sensory function, and level of assistance needed for ADLs.   - Monitor and report changes from baseline. Collaborate with interdisciplinary team to initiate plan and implement interventions as ordered.   - Provide and maintain a safe environment.  - Consider seizure precautions.  - Consider fall precautions.  - Consider aspiration precautions.  - Consider bleeding precautions.  Outcome: Progressing     Problem: Activity Intolerance/Impaired Mobility  Goal: Mobility/activity is maintained at optimum level for patient  Description: Interventions:  - Assess and monitor patient  barriers to mobility and need for assistive/adaptive devices.  - Assess patient's emotional response to limitations.  - Collaborate with interdisciplinary team and initiate plans and interventions as ordered.  - Encourage independent activity per ability.  - Maintain proper body alignment.  - Perform active/passive rom as tolerated/ordered.  - Plan activities to conserve energy.  - Turn  patient as appropriate  Outcome: Progressing     Problem: Communication Impairment  Goal: Ability to express needs and understand communication  Description: Assess patient's communication skills and ability to understand information.  Patient will demonstrate use of effective communication techniques, alternative methods of communication and understanding even if not able to speak.     - Encourage communication and provide alternate methods of communication as needed.  - Collaborate with case management/ for discharge needs.  - Include patient/family/caregiver in decisions related to communication.  Outcome: Progressing     Problem: Potential for Aspiration  Goal: Non-ventilated patient's risk of aspiration is minimized  Description: Assess and monitor vital signs, respiratory status, and labs (WBC).  Monitor for signs of aspiration (tachypnea, cough, rales, wheezing, cyanosis, fever).    - Assess and monitor patient's ability to swallow.  - Place patient up in chair to eat if possible.  - HOB up at 90 degrees to eat if unable to get patient up into chair.  - Supervise patient during oral intake.   - Instruct patient/ family to take small bites.  - Instruct patient/ family to take small single sips when taking liquids.  - Follow patient-specific strategies generated by speech pathologist.  Outcome: Progressing  Goal: Ventilated patient's risk of aspiration is minimized  Description: Assess and monitor vital signs, respiratory status, airway cuff pressure, and labs (WBC).  Monitor for signs of aspiration (tachypnea, cough, rales, wheezing, cyanosis, fever).    - Elevate head of bed 30 degrees if patient has tube feeding.  - Monitor tube feeding.  Outcome: Progressing     Problem: Nutrition  Goal: Nutrition/Hydration status is improving  Description: Monitor and assess patient's nutrition/hydration status for malnutrition (ex- brittle hair, bruises, dry skin, pale skin and conjunctiva, muscle wasting,  smooth red tongue, and disorientation). Collaborate with interdisciplinary team and initiate plan and interventions as ordered.  Monitor patient's weight and dietary intake as ordered or per policy. Utilize nutrition screening tool and intervene per policy. Determine patient's food preferences and provide high-protein, high-caloric foods as appropriate.     - Assist patient with eating.  - Allow adequate time for meals.  - Encourage patient to take dietary supplement as ordered.  - Collaborate with clinical nutritionist.  - Include patient/family/caregiver in decisions related to nutrition.  Outcome: Progressing     Problem: NEUROSENSORY - ADULT  Goal: Achieves stable or improved neurological status  Description: INTERVENTIONS  - Monitor and report changes in neurological status  - Monitor vital signs such as temperature, blood pressure, glucose, and any other labs ordered   - Initiate measures to prevent increased intracranial pressure  - Monitor for seizure activity and implement precautions if appropriate      Outcome: Progressing  Goal: Remains free of injury related to seizures activity  Description: INTERVENTIONS  - Maintain airway, patient safety  and administer oxygen as ordered  - Monitor patient for seizure activity, document and report duration and description of seizure to physician/advanced practitioner  - If seizure occurs,  ensure patient safety during seizure  - Reorient patient post seizure  - Seizure pads on all 4 side rails  - Instruct patient/family to notify RN of any seizure activity including if an aura is experienced  - Instruct patient/family to call for assistance with activity based on nursing assessment  - Administer anti-seizure medications if ordered    Outcome: Progressing  Goal: Achieves maximal functionality and self care  Description: INTERVENTIONS  - Monitor swallowing and airway patency with patient fatigue and changes in neurological status  - Encourage and assist patient to  increase activity and self care.   - Encourage visually impaired, hearing impaired and aphasic patients to use assistive/communication devices  Outcome: Progressing     Problem: CARDIOVASCULAR - ADULT  Goal: Maintains optimal cardiac output and hemodynamic stability  Description: INTERVENTIONS:  - Monitor I/O, vital signs and rhythm  - Monitor for S/S and trends of decreased cardiac output  - Administer and titrate ordered vasoactive medications to optimize hemodynamic stability  - Assess quality of pulses, skin color and temperature  - Assess for signs of decreased coronary artery perfusion  - Instruct patient to report change in severity of symptoms  Outcome: Progressing  Goal: Absence of cardiac dysrhythmias or at baseline rhythm  Description: INTERVENTIONS:  - Continuous cardiac monitoring, vital signs, obtain 12 lead EKG if ordered  - Administer antiarrhythmic and heart rate control medications as ordered  - Monitor electrolytes and administer replacement therapy as ordered  Outcome: Progressing     Problem: MUSCULOSKELETAL - ADULT  Goal: Maintain or return mobility to safest level of function  Description: INTERVENTIONS:  - Assess patient's ability to carry out ADLs; assess patient's baseline for ADL function and identify physical deficits which impact ability to perform ADLs (bathing, care of mouth/teeth, toileting, grooming, dressing, etc.)  - Assess/evaluate cause of self-care deficits   - Assess range of motion  - Assess patient's mobility  - Assess patient's need for assistive devices and provide as appropriate  - Encourage maximum independence but intervene and supervise when necessary  - Involve family in performance of ADLs  - Assess for home care needs following discharge   - Consider OT consult to assist with ADL evaluation and planning for discharge  - Provide patient education as appropriate  Outcome: Progressing

## 2024-11-26 NOTE — ASSESSMENT & PLAN NOTE
Pulse: 67   Rate control: Diltiazem 125 mg daily  AC: Holding patient's warfarin 7.5 daily, will price check Eliquis  Recent Labs     11/26/24  0742   INR 1.03      Plan:  Eliquis 5 mg twice daily  Continue home meds  Monitor rates/BP   F/u INR

## 2024-11-26 NOTE — H&P
"H&P - Hospitalist   Name: Ralph Flowers 49 y.o. male I MRN: [id number]  Unit/Bed#: ED-09 I Date of Admission: 11/26/2024   Date of Service: 11/26/2024 I Hospital Day: 0     Assessment & Plan  Stroke-like symptoms  Patient is a 48yo presenting from home who presented as stroke alert this a.m. and LKW was on sunday. Initial presenting deficits were: Ambulatory dysfunction due to decreased coordination of his left greater than right leg.  PMHX: Uncontrolled type 2 diabetes, hyperlipidemia, hypertension.  Paroxysmal A-fib not on anticoagulation, prior CVA in 2021  CURRENT SXS patient is still having gait imbalance due to decreased coordination, no headaches, dizziness, lightheadedness or weakness.  No focal deficits noted     Workup:  BP ED: Blood Pressure: (!) 171/94  EKG ED: Normal sinus rhythm, pulse 80s  CTA head and neck: No LVO, age-indeterminate lacunar infarct in the left mary beth, no stenosis of cervical carotid arteries, small left vertebral artery with proximal occlusion of the left V4 segment that could be chronic.  Lab Results   Component Value Date    HGBA1C 8.3 (H) 04/02/2024      No results found for: \"CXP9TDCSLTOE\", \"FREET4\"   No results found for: \"CHOLESTEROL\", \"TRIG\", \"HDL\", \"LDLCALC\"       CVA Risk Factors: A-fib not on anticoagulation, morbid obesity, prior CVA, uncontrolled hypertension, hyperlipidemia, type 2 diabetes  Risk factors:      Pertinent Scores:  - NIHSS: 1, mild left leg motor drift     Impression: Patient has very high risk factors for CVA, will admit under observation for risk stratification, control of hypertension/hyper glycemia     Plan:  Neuro Consulted, appreciate recs  Bedside speech eval  Stroke pathway  Neuro checks - q1hr x 4, q2hrs x 4, q4hrs for 72hrs  May aim for normotension, as below  Repeat CTH if GCS drops 2pts in 1hr  Follow-up fasting lipid panel & hemoglobin A1c  Follow-up TSH  Follow-up MRI brain w/o contrast  Follow-up TTE  Atorvastatin 80 mg daily, aspirin 81 mg " "daily  Eliquis 5 mg twice daily  - Outpatient neurological follow up     Thrombolytic Decision:  Unclear time of onset outside appropriate time window. and Symptoms resolved/clearly non disabling. NIHSS 0  Hypertensive urgency  Blood Pressure: (!) 171/94 patient hypertensive to 220s on arrival, patient has been noncompliant with antihypertensive regimen including daily amlodipine 10 mg, diltiazem 240 mg, lisinopril 40 mg (obtained via chart review).  Per neuro, okay to aim for normotension., however will pursue 25% or less reduction of BP since patient has likely been chronically hypertensive for months.  Holding amlodipine and reducing dose of diltiazem at this time.    Plan:  Continue lisinopril 40 mg  Start diltiazem 120 mg  Paroxysmal atrial fibrillation (HCC)  Pulse: 67   Rate control: Diltiazem 125 mg daily  AC: Holding patient's warfarin 7.5 daily, will price check Eliquis  Recent Labs     11/26/24  0742   INR 1.03      Plan:  Eliquis 5 mg twice daily  Continue home meds  Monitor rates/BP   F/u INR  Type 2 diabetes mellitus with hyperglycemia (HCC)  Lab Results   Component Value Date    HGBA1C 8.3 (H) 04/02/2024       Recent Labs     11/26/24  0738   POCGLU 282*     Blood Sugar Average: Last 72 hrs: (P) 282  Hold home metformin while inpatient and resume on discharge, however suspecting patient may need insulin upon discharge  Maintain glucose <180, SSI for coverage if indicated    Plan:  Diabetic diet  Insulin regimen-Sliding scale  Glucose checks and Insulin correction ACHS  Goal -180 while admitted, adjusting insulin regimen as appropriate  Monitor for hypoglycemia and treat per protocol   Morbid (severe) obesity due to excess calories (HCC)  Estimated body mass index is 42.58 kg/m² as calculated from the following:    Height as of this encounter: 5' 10\" (1.778 m).    Weight as of this encounter: 135 kg (296 lb 11.8 oz).   Body mass index is 42.58 kg/m².    Recommend incorporating a more whole foods " "plant-predominant diet along with decreasing consumption of red meats and processed foods  Per AHA guidelines, recommend moderate-vigorous intensity exercise for 30 minutes a day for 5 days a week or a total of 150 min/week   History of noncompliance with medical treatment  Patient has not taken medications for 3 months, states difficulty with make it to his PCP appointments due to work restrictions.    Plan:  Case management consulted  Essential hypertension  See plan for hypertensive urgency  Hypothyroidism  Continue PTA levothyroxine, follow-up TSH as above  Obstructive sleep apnea  Patient may need referral for sleep study  Hyperlipidemia  Continue PTA atorvastatin      VTE Pharmacologic Prophylaxis:   High Risk (Score >/= 5) - Pharmacological DVT Prophylaxis Ordered: apixaban (Eliquis). Sequential Compression Devices Ordered.  Code Status: Level 1 - Full Code   Discussion with family: Patient declined call to .     Anticipated Length of Stay: Patient will be admitted on an observation basis with an anticipated length of stay of less than 2 midnights secondary to strokelike symptoms.    History of Present Illness   Chief Complaint: \"I am having difficulty walking\"    Ralph Flowers is a 49 y.o. male with a PMH of acute CVA, paroxysmal A-fib on Coumadin, uncontrolled hypertension and hyperlipidemia, morbid obesity, JAVON, type 2 diabetes overall medication noncompliance who presents with 2 day history of imbalance \"walks to the left\" sensation of his left leg is shorter.   In December 2021, presented with slurred speech and right facial numbness  MRI brain showed white matter lesions suspicious for acute CV, started on Coumadin and aspirin, started on insulin and atorvastatin.   3 months of not taking any of his medications. Non compliance because hasn't seen his PCP in at least a year, difficulty due to his work hours.  Patient works at a [employer], he stated that he has been having gait " "dysfunction since Sunday, he initially did not want to see a doctor but was urged at the behest of his coworkers.  He currently has no acute complaints including no complaints of headaches, dizziness, speech difficulty.  His gait dysfunction has remained relatively stable since the onset of on Sunday.  He states he was not doing anything special in particular, just noted that his coordination was \"off\".      ED course: Troponins negative, BMP remarkable only for hyperglycemia 274, creatinine at baseline 1.1.  CBC overall unremarkable.  Vitals 200s over 90s diastolic, patient in normal sinus rhythm 80s to 60s  Imaging including CTA head and neck, no LVo age-indeterminate lacunar infarct in the left mary beth, no stenosis of cervical carotid arteries, small left vertebral artery with proximal occlusion of the left V4 segment that could be chronic.    2021 echo on review revealed mildly dilated left atrium, normal EF 55%, normal systolic function and wall motion.  Grade 1 diastolic dysfunction  Review of Systems   Constitutional:  Negative for activity change, appetite change, fatigue, fever and unexpected weight change.   HENT:  Negative for congestion.    Respiratory:  Negative for apnea, cough, chest tightness and shortness of breath.    Genitourinary:  Negative for difficulty urinating, dysuria, enuresis and flank pain.   Musculoskeletal:  Positive for gait problem.   Neurological:  Positive for weakness. Negative for dizziness, syncope, facial asymmetry, speech difficulty, light-headedness, numbness and headaches.       Historical Information   Past Medical History:   Diagnosis Date    Atrial fibrillation (HCC)     Diabetes mellitus (HCC)     Hyperlipidemia     Hypertension     Obesity     Stroke (HCC)      No past surgical history on file.  Social History     Tobacco Use    Smoking status: Never    Smokeless tobacco: Never   Vaping Use    Vaping status: Never Used   Substance and Sexual Activity    Alcohol use: Not on " file    Drug use: Not on file    Sexual activity: Not on file     E-Cigarette/Vaping    E-Cigarette Use Never User      E-Cigarette/Vaping Substances    Nicotine No     THC No     CBD No     Flavoring No     Other No     Unknown No      No family history on file.  Social History:  Marital Status:    Occupation: warehouse  Patient Pre-hospital Living Situation: Home, Alone  Patient Pre-hospital Level of Mobility: walks  Patient Pre-hospital Diet Restrictions: None    Meds/Allergies   I have reviewed home medications with patient personally.  Prior to Admission medications    Medication Sig Start Date End Date Taking? Authorizing Provider   EPINEPHrine (EPIPEN) 0.3 mg/0.3 mL SOAJ Inject as directed 5/26/11   Historical Provider, MD   ezetimibe-simvastatin (VYTORIN) 10-10 mg per tablet Take by mouth  Patient not taking: Reported on 1/19/2023 7/5/11   Historical Provider, MD   predniSONE 10 mg tablet Take 1 tablet (10 mg total) by mouth daily 6 tab day 1, 5 tab day 2, 4 tab day 3, 3 tab day 4, 2 tab day 5, 1 tab day 6 1/17/23   Imtiaz Mon MD   warfarin (COUMADIN) 7.5 mg tablet Take 7.5 mg by mouth daily 12/28/22   Historical Provider, MD     Allergies   Allergen Reactions    Bee Pollen Hives     Reaction Date: 13Oct2011;     Empagliflozin Hives       Objective :  Temp:  [98 °F (36.7 °C)] 98 °F (36.7 °C)  HR:  [63-83] 67  BP: (171-215)/(79-98) 171/94  Resp:  [17-25] 25  SpO2:  [96 %-99 %] 98 %  O2 Device: None (Room air)    Physical Exam  Vitals and nursing note reviewed.   Constitutional:       General: He is not in acute distress.     Appearance: He is well-developed. He is obese.   HENT:      Head: Normocephalic and atraumatic.   Eyes:      Conjunctiva/sclera: Conjunctivae normal.   Cardiovascular:      Rate and Rhythm: Normal rate and regular rhythm.      Heart sounds: No murmur heard.  Pulmonary:      Effort: Pulmonary effort is normal. No respiratory distress.      Breath sounds: Normal breath sounds.    Abdominal:      Palpations: Abdomen is soft.      Tenderness: There is no abdominal tenderness.   Musculoskeletal:         General: No swelling.      Cervical back: Neck supple.   Skin:     General: Skin is warm and dry.      Capillary Refill: Capillary refill takes less than 2 seconds.   Neurological:      Mental Status: He is alert and oriented to person, place, and time.      Motor: Weakness present.      Comments: Patient has gait deficit, there is mild left leg drift, however patient can lift leg against gravity and mild downward force.  NIH SS 1 by my evaluation   Psychiatric:         Mood and Affect: Mood normal.           Lab Results: I have reviewed the following results:  Results from last 7 days   Lab Units 11/26/24  0742   WBC Thousand/uL 7.62   HEMOGLOBIN g/dL 14.6   HEMATOCRIT % 44.1   PLATELETS Thousands/uL 215     Results from last 7 days   Lab Units 11/26/24  0742   SODIUM mmol/L 135   POTASSIUM mmol/L 3.8   CHLORIDE mmol/L 104   CO2 mmol/L 23   BUN mg/dL 21   CREATININE mg/dL 1.17   ANION GAP mmol/L 8   CALCIUM mg/dL 9.0   GLUCOSE RANDOM mg/dL 274*     Results from last 7 days   Lab Units 11/26/24  0742   INR  1.03     Results from last 7 days   Lab Units 11/26/24  0738   POC GLUCOSE mg/dl 282*     Lab Results   Component Value Date    HGBA1C 8.3 (H) 04/02/2024    HGBA1C 9.7 (H) 12/28/2023    HGBA1C 12.6 (H) 09/22/2023                 Administrative Statements       ** Please Note: This note has been constructed using a voice recognition system. **

## 2024-11-26 NOTE — ASSESSMENT & PLAN NOTE
Blood Pressure: (!) 171/94 patient hypertensive to 220s on arrival, patient has been noncompliant with antihypertensive regimen including daily amlodipine 10 mg, diltiazem 240 mg, lisinopril 40 mg (obtained via chart review).  Per neuro, okay to aim for normotension., however will pursue 25% or less reduction of BP since patient has likely been chronically hypertensive for months.  Holding amlodipine and reducing dose of diltiazem at this time.    Plan:  Continue lisinopril 40 mg  Start diltiazem 120 mg

## 2024-11-26 NOTE — Clinical Note
Case was discussed with FLORY and the patient's admission status was agreed to be Admission Status: inpatient status to the service of Dr. Gaxiola .

## 2024-11-26 NOTE — ED NOTES
Patient ambulated to bathroom independently. Patients gait still veering slightly to L side.      Mya Lyons RN  11/26/24 [id number]

## 2024-11-26 NOTE — ED NOTES
Patient noticed to be hypoxic on monitor in low 80s. This RN to bedside, discovered patient asleep. This RN woke pt up, and oxygen saturation returned to normal. 2L NC placed on patient at this time for comfort while sleeping.      Chula Villalta RN  11/26/24 [id number]

## 2024-11-26 NOTE — ED PROVIDER NOTES
Time reflects when diagnosis was documented in both MDM as applicable and the Disposition within this note       Time User Action Codes Description Comment    11/26/2024  7:15 AM Pascual Seals Add [R29.90] Stroke-like symptoms     11/26/2024  8:42 AM Matt Lewis Add [I10] Hypertension           ED Disposition       ED Disposition   Admit    Condition   Stable    Date/Time   Tue Nov 26, 2024  8:47 AM    Comment   Case was discussed with [facility] and the patient's admission status was agreed to be Admission Status: observation status to the service of Dr. Gaxiola .               Assessment & Plan       Medical Decision Making  49-year-old male presenting to the ED for abnormal gait with history of stroke.    Prehospital stroke alert called and patient direct to CT on arrival.    Stroke labs sent off.  EKG obtained.    See ED course for interpretation of results and imaging.  See EKG note.    Following workup, noted patient to be hypertensive.  Patient is not having chest pain shortness of breath, difficulty breathing.  No specific blood pressure management goal at this time per neuro.    Patient will be admitted to [facility] observation on telemetry for stroke pathway.    Patient admitted.    Amount and/or Complexity of Data Reviewed  Labs: ordered. Decision-making details documented in ED Course.  Radiology: ordered.    Risk  Prescription drug management.  Decision regarding hospitalization.        ED Course as of 11/26/24 0853   Tue Nov 26, 2024   0818 CBC and Platelet  Unremarkable and reassuring   0818 No specific blood pressure goals at this time per neuro.  Patient has no chest pain or shortness of breath or difficulty breathing.   0819 POC Glucose(!): 282  elevated   0837 Basic metabolic panel(!)  Electrolytes and kidney function within normal limits.  Glucose elevated.   0838 Protime-INR  Patient is not taking his warfarin.   0838 hs TnI 0hr: 8  No complaint of chest pain.   0839 IMPRESSION:     No acute  large vessel distribution infarct, hemorrhage, mass effect, shift or herniation. Age indeterminate lacunar infarct in the left mary beth that may correspond with finding described on outside MRI.  White matter changes likely due to chronic microangiopathy.        Findings were reported to Chuy Osborne  via HIPAA compliant secure electronic messaging at 7:49 a.m.        Workstation performed: [id number]     0839 IMPRESSION:     No significant stenosis of the cervical carotid arteries.  Small left vertebral artery with proximal occlusion of the left V4 segment that could be chronic. Patent left PICA that arises from the distal V3 segment.  No other large vessel occlusion.  Multifocal intracranial stenosis as above.     Findings were reported to Chuy Osborne  via HIPAA compliant secure electronic messaging at 7:49 a.m.        Workstation performed: [id number]     0847 Patient admitted to [facility] under stroke pathway.       Medications   iohexol (OMNIPAQUE) 350 MG/ML injection (MULTI-DOSE) 85 mL (85 mL Intravenous Given 11/26/24 0733)       ED Risk Strat Scores       Stroke Assessment       Row Name 11/26/24 0722             NIH Stroke Scale    Interval Baseline      Level of Consciousness (1a.) 0      LOC Questions (1b.) 0      LOC Commands (1c.) 0      Best Gaze (2.) 0      Visual (3.) 0      Facial Palsy (4.) 0      Motor Arm, Left (5a.) 0      Motor Arm, Right (5b.) 0      Motor Leg, Left (6a.) 0      Motor Leg, Right (6b.) 0      Limb Ataxia (7.) 0      Sensory (8.) 0      Best Language (9.) 0      Dysarthria (10.) 0      Extinction and Inattention (11.) (Formerly Neglect) 0      Total 0                                                          History of Present Illness       Chief Complaint   Patient presents with    STROKE Alert     Pre hospital stroke alert        Past Medical History:   Diagnosis Date    Atrial fibrillation (HCC)     Diabetes mellitus (HCC)     Hyperlipidemia     Hypertension     Obesity     Stroke  (HCC)       No past surgical history on file.   No family history on file.   Social History     Tobacco Use    Smoking status: Never    Smokeless tobacco: Never   Vaping Use    Vaping status: Never Used      E-Cigarette/Vaping    E-Cigarette Use Never User       E-Cigarette/Vaping Substances    Nicotine No     THC No     CBD No     Flavoring No     Other No     Unknown No       I have reviewed and agree with the history as documented.     49-year-old male past history of stroke, not taking any anticoagulation presenting by EMS as a prehospital stroke alert.  EMS had alerted providers by prehospital phone stating that patient was at work and started having ambulatory gait deficits.  I discussed patient over the phone and patient appeared to have difficulty answering questions and sounded to have slurred speech.  It was suggested that patient be transported to hospital for stroke alert.  Paramedics were able to discuss with the patient patient did except transport to the hospital.  Prehospital stroke alert called.  Patient does have a history of stroke and was prescribed warfarin but states that he does not take his warfarin anymore is continuing by himself.  Patient is denying headache, lightheadedness, dizziness, chest pain, palpitations, shortness of breath, difficulty breathing, fever, chills.        Review of Systems   Constitutional:  Negative for chills and fever.   HENT:  Negative for congestion and rhinorrhea.    Eyes:  Negative for visual disturbance.   Respiratory:  Negative for chest tightness and shortness of breath.    Cardiovascular:  Negative for chest pain and palpitations.   Gastrointestinal:  Negative for abdominal pain, diarrhea, nausea and vomiting.   Genitourinary:  Negative for dysuria.   Musculoskeletal:  Negative for back pain and myalgias.   Skin:  Negative for color change.   Neurological:  Negative for dizziness, tremors, seizures, weakness, numbness and headaches.   Psychiatric/Behavioral:   Negative for agitation and confusion.            Objective       ED Triage Vitals   Temperature Pulse Blood Pressure Respirations SpO2 Patient Position - Orthostatic VS   11/26/24 0750 11/26/24 0743 11/26/24 0743 11/26/24 0743 11/26/24 0743 11/26/24 0743   98 °F (36.7 °C) 83 (!) 212/97 22 98 % Lying      Temp Source Heart Rate Source BP Location FiO2 (%) Pain Score    11/26/24 0750 11/26/24 0743 11/26/24 0743 -- 11/26/24 0743    Oral Monitor Right arm  No Pain      Vitals      Date and Time Temp Pulse SpO2 Resp BP Pain Score FACES Pain Rating User   11/26/24 0831 -- 68 97 % 22 192/79 -- -- CS   11/26/24 0815 -- 80 97 % 24 182/90 -- -- CS   11/26/24 0800 -- 75 99 % 19 187/88 -- -- CS   11/26/24 0750 98 °F (36.7 °C) 79 98 % 22 175/82 No Pain -- ML   11/26/24 0743 -- 83 98 % 22 212/97 No Pain -- DR            Physical Exam  Constitutional:       Appearance: Normal appearance. He is obese.   HENT:      Head: Normocephalic and atraumatic.      Right Ear: External ear normal.      Left Ear: External ear normal.      Nose: Nose normal.      Mouth/Throat:      Pharynx: Oropharynx is clear.   Eyes:      Extraocular Movements: Extraocular movements intact.      Pupils: Pupils are equal, round, and reactive to light.   Cardiovascular:      Rate and Rhythm: Normal rate and regular rhythm.      Pulses: Normal pulses.      Heart sounds: Normal heart sounds.   Pulmonary:      Effort: Pulmonary effort is normal.      Breath sounds: Normal breath sounds.   Abdominal:      General: Bowel sounds are normal.      Palpations: Abdomen is soft.   Musculoskeletal:         General: Normal range of motion.      Cervical back: Normal range of motion.   Skin:     General: Skin is warm.      Capillary Refill: Capillary refill takes less than 2 seconds.   Neurological:      General: No focal deficit present.      Mental Status: He is alert and oriented to person, place, and time.      Gait: Gait abnormal.   Psychiatric:         Mood and Affect:  Mood normal.         Behavior: Behavior normal.         Results Reviewed       Procedure Component Value Units Date/Time    HS Troponin 0hr (reflex protocol) [[id number]]  (Normal) Collected: 11/26/24 0742    Lab Status: Final result Specimen: Blood from Arm, Right Updated: 11/26/24 0832     hs TnI 0hr 8 ng/L     HS Troponin I 2hr [[id number]]     Lab Status: No result Specimen: Blood     HS Troponin I 4hr [[id number]]     Lab Status: No result Specimen: Blood     HS Troponin I 4hr [[id number]]     Lab Status: No result Specimen: Blood     HS Troponin I 2hr [[id number]]     Lab Status: No result Specimen: Blood     Basic metabolic panel [[id number]]  (Abnormal) Collected: 11/26/24 0742    Lab Status: Final result Specimen: Blood from Arm, Right Updated: 11/26/24 0823     Sodium 135 mmol/L      Potassium 3.8 mmol/L      Chloride 104 mmol/L      CO2 23 mmol/L      ANION GAP 8 mmol/L      BUN 21 mg/dL      Creatinine 1.17 mg/dL      Glucose 274 mg/dL      Calcium 9.0 mg/dL      eGFR 73 ml/min/1.73sq m     Narrative:      [employer] guidelines for Chronic Kidney Disease (CKD):     Stage 1 with normal or high GFR (GFR > 90 mL/min/1.73 square meters)    Stage 2 Mild CKD (GFR = 60-89 mL/min/1.73 square meters)    Stage 3A Moderate CKD (GFR = 45-59 mL/min/1.73 square meters)    Stage 3B Moderate CKD (GFR = 30-44 mL/min/1.73 square meters)    Stage 4 Severe CKD (GFR = 15-29 mL/min/1.73 square meters)    Stage 5 End Stage CKD (GFR <15 mL/min/1.73 square meters)  Note: GFR calculation is accurate only with a steady state creatinine    Protime-INR [[id number]]  (Normal) Collected: 11/26/24 0742    Lab Status: Final result Specimen: Blood from Arm, Right Updated: 11/26/24 0807     Protime 14.2 seconds      INR 1.03    Narrative:      INR Therapeutic Range    Indication                                             INR Range      Atrial Fibrillation                                                2.0-3.0  Hypercoagulable State                                    2.0.2.3  Left Ventricular Asist Device                            2.0-3.0  Mechanical Heart Valve                                  -    Aortic(with afib, MI, embolism, HF, LA enlargement,    and/or coagulopathy)                                     2.0-3.0 (2.5-3.5)     Mitral                                                             2.5-3.5  Prosthetic/Bioprosthetic Heart Valve               2.0-3.0  Venous thromboembolism (VTE: VT, PE        2.0-3.0    APTT [[id number]]  (Normal) Collected: 11/26/24 0742    Lab Status: Final result Specimen: Blood from Arm, Right Updated: 11/26/24 0807     PTT 29 seconds     CBC and Platelet [[id number]]  (Normal) Collected: 11/26/24 0742    Lab Status: Final result Specimen: Blood from Arm, Right Updated: 11/26/24 0759     WBC 7.62 Thousand/uL      RBC 5.04 Million/uL      Hemoglobin 14.6 g/dL      Hematocrit 44.1 %      MCV 88 fL      MCH 29.0 pg      MCHC 33.1 g/dL      RDW 13.4 %      Platelets 215 Thousands/uL      MPV 10.4 fL     Fingerstick Glucose (POCT) [[id number]]  (Abnormal) Collected: 11/26/24 0738    Lab Status: Final result Specimen: Blood Updated: 11/26/24 0743     POC Glucose 282 mg/dl             CTA stroke alert (head/neck)   Final Interpretation by E. Alec Schoenberger, MD (11/26 0758)      No significant stenosis of the cervical carotid arteries.   Small left vertebral artery with proximal occlusion of the left V4 segment that could be chronic. Patent left PICA that arises from the distal V3 segment.   No other large vessel occlusion.   Multifocal intracranial stenosis as above.      Findings were reported to Chuy Osborne  via HIPAA compliant secure electronic messaging at 7:49 a.m.         Workstation performed: [id number]         CT stroke alert brain   Final Interpretation by E. Alec Schoenberger, MD (11/26 0801)      No acute large vessel distribution infarct, hemorrhage, mass effect, shift  or herniation. Age indeterminate lacunar infarct in the left mary beth that may correspond with finding described on outside MRI.   White matter changes likely due to chronic microangiopathy.         Findings were reported to Chuy Osborne  via HIPAA compliant secure electronic messaging at 7:49 a.m.         Workstation performed: [id number]             ECG 12 Lead Documentation Only    Date/Time: 11/26/2024 7:41 AM    Performed by: Matt Ho MD  Authorized by: Matt Ho MD    Indications / Diagnosis:  Stroke like  ECG reviewed by me, the ED Provider: yes    Patient location:  ED  Previous ECG:     Previous ECG:  Unavailable  Interpretation:     Interpretation: abnormal    Rate:     ECG rate:  84    ECG rate assessment: normal    Rhythm:     Rhythm: sinus rhythm    Ectopy:     Ectopy: none    QRS:     QRS axis:  Normal    QRS intervals:  Normal  Conduction:     Conduction: abnormal      Abnormal conduction: LAFB    ST segments:     ST segments:  Normal  T waves:     T waves: non-specific        ED Medication and Procedure Management   Prior to Admission Medications   Prescriptions Last Dose Informant Patient Reported? Taking?   EPINEPHrine (EPIPEN) 0.3 mg/0.3 mL SOAJ   Yes No   Sig: Inject as directed   ezetimibe-simvastatin (VYTORIN) 10-10 mg per tablet   Yes No   Sig: Take by mouth   Patient not taking: Reported on 1/19/2023   predniSONE 10 mg tablet   No No   Sig: Take 1 tablet (10 mg total) by mouth daily 6 tab day 1, 5 tab day 2, 4 tab day 3, 3 tab day 4, 2 tab day 5, 1 tab day 6   warfarin (COUMADIN) 7.5 mg tablet   Yes No   Sig: Take 7.5 mg by mouth daily      Facility-Administered Medications: None     Patient's Medications   Discharge Prescriptions    No medications on file     No discharge procedures on file.  ED SEPSIS DOCUMENTATION   Time reflects when diagnosis was documented in both MDM as applicable and the Disposition within this note       Time User Action Codes Description Comment     11/26/2024  7:15 AM Pascual Seals [R29.90] Stroke-like symptoms     11/26/2024  8:42 AM Matt Ho [I10] Hypertension                  Matt Ho MD  11/26/24 0854

## 2024-11-26 NOTE — ED ATTENDING ATTESTATION
11/26/2024  I, Pascual Seals MD, saw and evaluated the patient. I have discussed the patient with the resident/non-physician practitioner and agree with the resident's/non-physician practitioner's findings, Plan of Care, and MDM as documented in the resident's/non-physician practitioner's note, except where noted. All available labs and Radiology studies were reviewed.  I was present for key portions of any procedure(s) performed by the resident/non-physician practitioner and I was immediately available to provide assistance.    At this point I agree with the current assessment done in the Emergency Department. I have conducted an independent evaluation of this patient a history and physical is as follows:    This is a 49 y.o. old male who presents to the ED for evaluation of stroke symptoms. Difficulty walking, hx prior stroke. Symptoms have started >24h ago. Was initially a stroke alert, but then cancelled by neurology.    VS and nursing notes reviewed  General: Appears in NAD, awake, alert, speaking normally in full sentences.   Well-nourished, well-developed. Appears stated age.   Head: Normocephalic, atraumatic.  Eyes: EOMI. Vision grossly normal. No subconjunctival hemorrhages or occular discharge noted. Symmetrical lids.   ENT: Atraumatic external nose and ears. No stridor. Normal phonation. No drooling. Normal swallowing.   Neck: No JVD. FROM. No goiter  CV: No pallor. Normal rate.  Lungs: No tachypnea. No respiratory distress.  MSK: Moving all extremities equally, no peripheral edema  Skin: Dry, intact. No cyanosis  Neuro: Awake, alert, GCS15. CN II-XII grossly intact. Grossly normal gait.  Psychiatric/Behavioral: Appropriate mood and affect.     A/P: This is a 49 y.o. male who presents to the ED for evaluation of stroke symptoms. Stroke work up, admit. No TPA as symptoms >24h since onset. NISS=0      ED Course         Critical Care Time  Procedures

## 2024-11-26 NOTE — ASSESSMENT & PLAN NOTE
"Patient is a 50yo presenting from home who presented as stroke alert this a.m. and LKW was on sunday. Initial presenting deficits were: Ambulatory dysfunction due to decreased coordination of his left greater than right leg.  PMHX: Uncontrolled type 2 diabetes, hyperlipidemia, hypertension.  Paroxysmal A-fib not on anticoagulation, prior CVA in 2021  CURRENT SXS patient is still having gait imbalance due to decreased coordination, no headaches, dizziness, lightheadedness or weakness.  No focal deficits noted     Workup:  BP ED: Blood Pressure: (!) 171/94  EKG ED: Normal sinus rhythm, pulse 80s  CTA head and neck: No LVO, age-indeterminate lacunar infarct in the left mary beth, no stenosis of cervical carotid arteries, small left vertebral artery with proximal occlusion of the left V4 segment that could be chronic.  Lab Results   Component Value Date    HGBA1C 8.3 (H) 04/02/2024      No results found for: \"[id number]\", \"FREET4\"   No results found for: \"CHOLESTEROL\", \"TRIG\", \"HDL\", \"LDLCALC\"       CVA Risk Factors: A-fib not on anticoagulation, morbid obesity, prior CVA, uncontrolled hypertension, hyperlipidemia, type 2 diabetes  Risk factors:      Pertinent Scores:  - NIHSS: 1, mild left leg motor drift     Impression: Patient has very high risk factors for CVA, will admit under observation for risk stratification, control of hypertension/hyper glycemia     Plan:  Neuro Consulted, appreciate recs  Bedside speech eval  Stroke pathway  Neuro checks - q1hr x 4, q2hrs x 4, q4hrs for 72hrs  May aim for normotension, as below  Repeat CTH if GCS drops 2pts in 1hr  Follow-up fasting lipid panel & hemoglobin A1c  Follow-up TSH  Follow-up MRI brain w/o contrast  Follow-up TTE  Atorvastatin 80 mg daily, aspirin 81 mg daily  Eliquis 5 mg twice daily  - Outpatient neurological follow up     Thrombolytic Decision:  Unclear time of onset outside appropriate time window. and Symptoms resolved/clearly non disabling. NIHSS 0  "

## 2024-11-26 NOTE — ASSESSMENT & PLAN NOTE
Lab Results   Component Value Date    HGBA1C 8.3 (H) 04/02/2024       Recent Labs     11/26/24  0738   POCGLU 282*       Blood Sugar Average: Last 72 hrs:  (P) 282

## 2024-11-26 NOTE — CONSULTS
"Consultation - Neurology   Name: Ralph Flowers 49 y.o. male I MRN: [id number]  Unit/Bed#: ED-09 I Date of Admission: 11/26/2024   Date of Service: 11/26/2024 I Hospital Day: 0   Consult to Neurology  Consult performed by: Cici Chin DO  Consult ordered by: Pascual Seals MD        Physician Requesting Evaluation: Pascual Seals MD   Reason for Evaluation / Principal Problem: stroke like symptoms    Assessment & Plan  Stroke-like symptoms  Patient with hx of stroke in 2021 (residual right sided facial numbness) presents with symptoms described as feeling \"off balance,\" drifting toward left side while walking since Sunday. LKW was Sunday morning, 2 days ago. NIHSS = 0.     12/2021 Prior MRI/MRA: Multifocal T2/FLAIR signal hyperintensities within the periventricular and juxtacortical white matter in a pattern concordant with demyelination disease/multiple sclerosis. There is a focal region of abnormal diffusion restriction with abnormal FLAIR T2 signal within the brachium pontis/mary beth which probably reflects active demyelinating disease rather than focal ischemia, given the additional findings and the brain parenchyma. MR angiography of the head is motion degraded. There may be stenoses involving the middle cerebral arteries bilaterally. No high-grade stenosis or large vessel occlusion. Congenitally hypoplastic left vertebral artery.     11/26/24 CTH:  No acute large vessel distribution infarct, hemorrhage, mass effect, shift or herniation. Age indeterminate lacunar infarct in the left mary beth that may correspond with finding described on outside MRI. White matter changes likely due to chronic microangiopathy.    11/26/24 CTA: No significant stenosis of the cervical carotid arteries.  Small left vertebral artery with proximal occlusion of the left V4 segment that could be chronic. Patent left PICA that arises from the distal V3 segment.No other large vessel occlusion. Severe stenosis in the right A2 segment and " "proximal left A3 segment. Mild narrowing in the M1 segment. Multifocal moderate and severe stenosis of the bilateral M2 branches, left worse than right. Severe focal stenosis in the dominant right vertebral artery. Severe stenosis in the bilateral P2 segments.     Assessment: HTN encephalopathy vs small vessel stroke    Plan:  Admit under stroke pathway  MRI brain without contrast  Lipid panel, A1C (8.3 in 4/2024)  Recommend doac vs warfarin, ASA 81 mg daily, statin  PT/OT/ST/swallow eval  Frequent neuro checks  Symptoms >24 hrs, recommend normotension    Morbid (severe) obesity due to excess calories (HCC)    Paroxysmal atrial fibrillation (HCC)    Type 2 diabetes mellitus with hyperglycemia (HCC)  Lab Results   Component Value Date    HGBA1C 8.3 (H) 04/02/2024       Recent Labs     11/26/24  0738   POCGLU 282*       Blood Sugar Average: Last 72 hrs:  (P) 282    Hypertensive urgency    I have discussed the above management plan in detail with the primary service.   Neurology service will follow.  Please contact the SecureChat role for the Neurology service with any questions/concerns.    Recommendations for outpatient neurological follow up have yet to be determined.    History of Present Illness   Ralph Flowers is a 48 yo M with PMH of Afib, HTN, DM, HLD, obesity, hx stroke in 2021 with residual numbness of right side of face, presents with difficulty ambulating. Last known well Sunday AM, 2 days ago. EMS reported slurred speech, stroke alert called. Patient denies slurred speech. Due to symptoms > 48 hours, acute stroke alert cancelled. Patient reports for the last 2.5 weeks, he has been feeling \"buckling inward of his right knee\". Reports this resolved on Sunday and then patient began drifting to the left side when walking. Denies any fall, though feels like might fall due to unsteadiness. Denies slurred speech, lightheadedness or dizziness, syncope, weakness, paresthesias, difficulty with memory. Reports " some blurred vision over the last 2 weeks which he attributes to high blood sugar up to 400s, which has since resolved. Reports blurry vision subsided on Sunday, blood sugar has been down to 160-170s since that time. Reports noncompliance with medications - was supposed to be on warfarin after stroke in 2021, has not been compliant for at least 3 months - unable to make PCP appointment and has not rescheduled. ASA was recommended at prior visit in 2021 as well, though patient denies ever taking this at home. Denies taking BP medications, though is aware of HTN, does not take BP at home. Takes synjardy for DM when blood sugar is elevated.    Review of Systems   Constitutional:  Negative for chills and fever.   HENT:  Negative for hearing loss, tinnitus and voice change.    Eyes:  Negative for pain and visual disturbance.   Respiratory:  Negative for cough and shortness of breath.    Cardiovascular:  Negative for chest pain and palpitations.   Gastrointestinal:  Negative for abdominal pain, nausea and vomiting.   Genitourinary:  Negative for dysuria.   Musculoskeletal:  Positive for gait problem.   Skin:  Negative for color change and rash.   Neurological:  Negative for dizziness, tremors, syncope, facial asymmetry, speech difficulty, weakness, light-headedness, numbness and headaches.   Psychiatric/Behavioral:  Negative for confusion.    All other systems reviewed and are negative.       I have reviewed the patient's PMH, PSH, Social History, Family History, Meds, and Allergies    Objective :  Temp:  [98 °F (36.7 °C)] 98 °F (36.7 °C)  HR:  [79-83] 79  BP: (175-212)/(82-97) 175/82  Resp:  [22] 22  SpO2:  [98 %] 98 %  O2 Device: None (Room air)    Physical Exam  Eyes:      General: Lids are normal.      Extraocular Movements: Extraocular movements intact.      Pupils: Pupils are equal, round, and reactive to light.   Neurological:      Motor: Motor strength is normal.    Neurological Exam  Mental Status  Awake, alert  and oriented to person, place and time. Language is fluent with no aphasia.    Cranial Nerves  CN II: Visual acuity is normal. Visual fields full to confrontation.  CN III, IV, VI: Extraocular movements intact bilaterally. Normal lids and orbits bilaterally. Pupils equal round and reactive to light bilaterally.  CN V: Facial sensation is normal.  CN VII: Full and symmetric facial movement.  CN VIII: Hearing is normal.  CN IX, X: Palate elevates symmetrically. Normal gag reflex.  CN XI: Shoulder shrug strength is normal.  CN XII: Tongue midline without atrophy or fasciculations.    Motor   Strength is 5/5 throughout all four extremities.    Sensory  Light touch is normal in upper and lower extremities.     Coordination  Right: Finger-to-nose normal. Heel-to-shin normal.    NIHSS: 0  1a.Level of Consciousness: 0 = Alert   1b. LOC Questions: 0 = Answers both correctly   1c. LOC Commands: 0 = Obeys both correctly   2. Best Gaze: 0 = Normal   3. Visual: 0 = No visual field loss   4. Facial Palsy: 0=Normal symmetry   5a. Motor Right Arm: 0=No drift, limb holds 90 (or 45) degrees for full 10 seconds   5b. Motor Left Arm: 0=No drift, limb holds 90 (or 45) degrees for full 10 seconds   6a. Motor Right Le=No drift, limb holds 90 (or 45) degrees for full 10 seconds   6b. Motor Left Le=No drift, limb holds 90 (or 45) degrees for full 10 seconds   7. Limb Ataxia:  0=Absent   8. Sensory: 0=Normal; no sensory loss   9. Language/Aphasia:  0=Normal, no aphasia   10. Dysarthria: 0=Normal articulation   11. Extinction and Inattention (formerly Neglect): 0=No abnormality   Total Score: 0           Lab Results: I have reviewed the following results:I have personally reviewed pertinent reports.  , CBC:   Results from last 7 days   Lab Units 24  0742   WBC Thousand/uL 7.62   RBC Million/uL 5.04   HEMOGLOBIN g/dL 14.6   HEMATOCRIT % 44.1   MCV fL 88   PLATELETS Thousands/uL 215   BMP/CMP:   Results from last 7 days   Lab  Units 11/26/24  0742   SODIUM mmol/L 135   POTASSIUM mmol/L 3.8   CHLORIDE mmol/L 104   CO2 mmol/L 23   BUN mg/dL 21   CREATININE mg/dL 1.17   CALCIUM mg/dL 9.0   EGFR ml/min/1.73sq m 73   Coagulation:   Results from last 7 days   Lab Units 11/26/24  0742   INR  1.03   , Lipid Profile:     Recent Labs     11/26/24  0742   WBC 7.62   HGB 14.6   HCT 44.1        Imaging Results Review: I reviewed radiology reports from this admission including: CT head.  Other Study Results Review: Other studies reviewed include: previous MRI/MRA, carotid US from 2021    VTE Prophylaxis: VTE covered by:  apixaban, Oral        Administrative Statements   Topics discussed with the patient / family include symptom assessment and management and medication review.

## 2024-11-26 NOTE — ASSESSMENT & PLAN NOTE
Lab Results   Component Value Date    HGBA1C 8.3 (H) 04/02/2024       Recent Labs     11/26/24  0738   POCGLU 282*     Blood Sugar Average: Last 72 hrs: (P) 282  Hold home metformin while inpatient and resume on discharge, however suspecting patient may need insulin upon discharge  Maintain glucose <180, SSI for coverage if indicated    Plan:  Diabetic diet  Insulin regimen-Sliding scale  Glucose checks and Insulin correction ACHS  Goal -180 while admitted, adjusting insulin regimen as appropriate  Monitor for hypoglycemia and treat per protocol

## 2024-11-26 NOTE — CASE MANAGEMENT
Case Management Assessment & Discharge Planning Note    Patient name Ralph Flowers  Location ED-09/ED-09 MRN [id number]  : 1975 Date 2024       Current Admission Date: 2024  Current Admission Diagnosis:Stroke-like symptoms   Patient Active Problem List    Diagnosis Date Noted Date Diagnosed    Stroke-like symptoms 2024     Hypertensive urgency 2024     History of noncompliance with medical treatment 2021     Morbid (severe) obesity due to excess calories (HCC) 2021     Obstructive sleep apnea 2021     Type 2 diabetes mellitus with hyperglycemia (HCC) 2021     Hyperlipidemia 2018     Hypothyroidism 05/10/2017     Paroxysmal atrial fibrillation (HCC) 2011     Essential hypertension 2011       LOS (days): 0  Geometric Mean LOS (GMLOS) (days):   Days to GMLOS:     OBJECTIVE:              Current admission status: Observation       Preferred Pharmacy:   [id number] - [place], [place] - [address]  [address] [place] [address]  Phone: [phone] Fax: [phone]    [id number] [place] - [address]  [address]  [place] [address]  Phone: [phone] Fax: [phone]    Primary Care Provider: Xiomara Stinson DO    Primary Insurance: [facility] BLUE [facility]  Secondary Insurance:     ASSESSMENT:  Active Health Care Proxies    There are no active Health Care Proxies on file.                 Readmission Root Cause  30 Day Readmission: No    Patient Information  Admitted from:: Home  Mental Status: Alert  During Assessment patient was accompanied by: Not accompanied during assessment  Assessment information provided by:: Patient  Primary Caregiver: Self  Support Systems: Self  What city do you live in[place]  Home entry access options. Select all that apply.: Stairs  Number of steps to enter home.: 5  Do the steps have railings?: Yes  Type of Current Residence: [address] [place]  Upon entering  residence, is there a bedroom on the main floor (no further steps)?: No  A bedroom is located on the following floor levels of residence (select all that apply):: 2nd Floor  Upon entering residence, is there a bathroom on the main floor (no further steps)?: Yes  Number of steps to 2nd floor from main floor: One Flight  Living Arrangements: Lives Alone  Is patient a ?: No    Activities of Daily Living Prior to Admission  Functional Status: Independent  Completes ADLs independently?: Yes  Ambulates independently?: Yes  Does patient use assisted devices?: No  Does patient currently own DME?: No  Does patient have a history of Outpatient Therapy (PT/OT)?: No  Does the patient have a history of Short-Term Rehab?: No  Does patient have a history of HHC?: No  Does patient currently have HHC?: No         Patient Information Continued  Income Source: Employed  Does patient have prescription coverage?: Yes  Does patient receive dialysis treatments?: No  Does patient have a history of substance abuse?: No  Does patient have a history of Mental Health Diagnosis?: No         Means of Transportation  Means of Transport to Appts:: Drives Self          DISCHARGE DETAILS:  Other Referral/Resources/Interventions Provided:  Interventions: FindHelp, InfoLink, Prescription Price Check  Referral Comments: CM consulted by the patient's provider re: a price check on eliquis and the OP Resources/Social Issues for the patient. CM contacted the patient's [employer] for a price check and was told by the [employer] that the copay would be $50.00. CM notified the patient's provider of the copay; CM met with patient at bedside re: resources. Patient stated that he needs help with everything but is always told he makes too much. Patient stated that he pays child support and that takes up a big portion of his paycheck and he is struggling with paying utilities, rent, getting food etc. Patient also stated that it is hard for him to get to  PCP appointments because he gets off at 4-4:30 and many places are closed at that time. CM told the patient that he could request a virtual visit with his primary if they have that. CM provided patient with resources via Find help as well as an [employer] card that way the patient can find a PCP that works better with his work schedule.

## 2024-11-26 NOTE — ASSESSMENT & PLAN NOTE
"Patient with hx of stroke in 2021 (residual right sided facial numbness) presents with symptoms described as feeling \"off balance,\" drifting toward left side while walking since Sunday. LKW was Sunday morning, 2 days ago. NIHSS = 0.     12/2021 Prior MRI/MRA: Multifocal T2/FLAIR signal hyperintensities within the periventricular and juxtacortical white matter in a pattern concordant with demyelination disease/multiple sclerosis. There is a focal region of abnormal diffusion restriction with abnormal FLAIR T2 signal within the brachium pontis/mary beth which probably reflects active demyelinating disease rather than focal ischemia, given the additional findings and the brain parenchyma. MR angiography of the head is motion degraded. There may be stenoses involving the middle cerebral arteries bilaterally. No high-grade stenosis or large vessel occlusion. Congenitally hypoplastic left vertebral artery.     11/26/24 CTH:  No acute large vessel distribution infarct, hemorrhage, mass effect, shift or herniation. Age indeterminate lacunar infarct in the left mary beth that may correspond with finding described on outside MRI. White matter changes likely due to chronic microangiopathy.    11/26/24 CTA: No significant stenosis of the cervical carotid arteries.  Small left vertebral artery with proximal occlusion of the left V4 segment that could be chronic. Patent left PICA that arises from the distal V3 segment.No other large vessel occlusion. Severe stenosis in the right A2 segment and proximal left A3 segment. Mild narrowing in the M1 segment. Multifocal moderate and severe stenosis of the bilateral M2 branches, left worse than right. Severe focal stenosis in the dominant right vertebral artery. Severe stenosis in the bilateral P2 segments.     Assessment: HTN encephalopathy vs small vessel stroke    Plan:  Admit under stroke pathway  MRI brain without contrast  Lipid panel, A1C (8.3 in 4/2024)  Recommend doac vs warfarin, ASA 81 " mg daily, statin  PT/OT/ST/swallow eval  Frequent neuro checks  Symptoms >24 hrs, recommend normotension

## 2024-11-27 LAB
ANION GAP SERPL CALCULATED.3IONS-SCNC: 7 MMOL/L (ref 4–13)
BUN SERPL-MCNC: 16 MG/DL (ref 5–25)
CALCIUM SERPL-MCNC: 9.1 MG/DL (ref 8.4–10.2)
CHLORIDE SERPL-SCNC: 107 MMOL/L (ref 96–108)
CHOLEST SERPL-MCNC: 198 MG/DL (ref ?–200)
CO2 SERPL-SCNC: 25 MMOL/L (ref 21–32)
CREAT SERPL-MCNC: 1.03 MG/DL (ref 0.6–1.3)
DME PARACHUTE DELIVERY DATE REQUESTED: NORMAL
DME PARACHUTE ITEM DESCRIPTION: NORMAL
DME PARACHUTE ORDER STATUS: NORMAL
DME PARACHUTE SUPPLIER NAME: NORMAL
DME PARACHUTE SUPPLIER PHONE: NORMAL
ERYTHROCYTE [DISTWIDTH] IN BLOOD BY AUTOMATED COUNT: 13.5 % (ref 11.6–15.1)
EST. AVERAGE GLUCOSE BLD GHB EST-MCNC: 318 MG/DL
EST. AVERAGE GLUCOSE BLD GHB EST-MCNC: 324 MG/DL
GFR SERPL CREATININE-BSD FRML MDRD: 85 ML/MIN/1.73SQ M
GLUCOSE P FAST SERPL-MCNC: 121 MG/DL (ref 65–99)
GLUCOSE SERPL-MCNC: 121 MG/DL (ref 65–140)
GLUCOSE SERPL-MCNC: 128 MG/DL (ref 65–140)
GLUCOSE SERPL-MCNC: 156 MG/DL (ref 65–140)
GLUCOSE SERPL-MCNC: 169 MG/DL (ref 65–140)
GLUCOSE SERPL-MCNC: 182 MG/DL (ref 65–140)
HBA1C MFR BLD: 12.7 %
HBA1C MFR BLD: 12.9 %
HCT VFR BLD AUTO: 43.4 % (ref 36.5–49.3)
HDLC SERPL-MCNC: 38 MG/DL
HGB BLD-MCNC: 14.2 G/DL (ref 12–17)
LDLC SERPL CALC-MCNC: 132 MG/DL (ref 0–100)
MAGNESIUM SERPL-MCNC: 1.9 MG/DL (ref 1.9–2.7)
MCH RBC QN AUTO: 28.7 PG (ref 26.8–34.3)
MCHC RBC AUTO-ENTMCNC: 32.7 G/DL (ref 31.4–37.4)
MCV RBC AUTO: 88 FL (ref 82–98)
PLATELET # BLD AUTO: 222 THOUSANDS/UL (ref 149–390)
PMV BLD AUTO: 10.4 FL (ref 8.9–12.7)
POTASSIUM SERPL-SCNC: 4.5 MMOL/L (ref 3.5–5.3)
RBC # BLD AUTO: 4.94 MILLION/UL (ref 3.88–5.62)
SODIUM SERPL-SCNC: 139 MMOL/L (ref 135–147)
TRIGL SERPL-MCNC: 142 MG/DL (ref ?–150)
WBC # BLD AUTO: 8.89 THOUSAND/UL (ref 4.31–10.16)

## 2024-11-27 PROCEDURE — 80061 LIPID PANEL: CPT

## 2024-11-27 PROCEDURE — 83036 HEMOGLOBIN GLYCOSYLATED A1C: CPT

## 2024-11-27 PROCEDURE — 85027 COMPLETE CBC AUTOMATED: CPT

## 2024-11-27 PROCEDURE — 99233 SBSQ HOSP IP/OBS HIGH 50: CPT | Performed by: PSYCHIATRY & NEUROLOGY

## 2024-11-27 PROCEDURE — 97163 PT EVAL HIGH COMPLEX 45 MIN: CPT

## 2024-11-27 PROCEDURE — 99233 SBSQ HOSP IP/OBS HIGH 50: CPT | Performed by: INTERNAL MEDICINE

## 2024-11-27 PROCEDURE — 97166 OT EVAL MOD COMPLEX 45 MIN: CPT

## 2024-11-27 PROCEDURE — 80048 BASIC METABOLIC PNL TOTAL CA: CPT

## 2024-11-27 PROCEDURE — 83735 ASSAY OF MAGNESIUM: CPT

## 2024-11-27 PROCEDURE — 97535 SELF CARE MNGMENT TRAINING: CPT

## 2024-11-27 PROCEDURE — 82948 REAGENT STRIP/BLOOD GLUCOSE: CPT

## 2024-11-27 RX ORDER — HYDROCHLOROTHIAZIDE 25 MG/1
25 TABLET ORAL DAILY
Status: DISCONTINUED | OUTPATIENT
Start: 2024-11-27 | End: 2024-11-28 | Stop reason: HOSPADM

## 2024-11-27 RX ADMIN — INSULIN LISPRO 2 UNITS: 100 INJECTION, SOLUTION INTRAVENOUS; SUBCUTANEOUS at 17:30

## 2024-11-27 RX ADMIN — APIXABAN 5 MG: 5 TABLET, FILM COATED ORAL at 08:19

## 2024-11-27 RX ADMIN — APIXABAN 5 MG: 5 TABLET, FILM COATED ORAL at 17:27

## 2024-11-27 RX ADMIN — DILTIAZEM HYDROCHLORIDE 120 MG: 120 CAPSULE, COATED, EXTENDED RELEASE ORAL at 08:19

## 2024-11-27 RX ADMIN — INSULIN LISPRO 2 UNITS: 100 INJECTION, SOLUTION INTRAVENOUS; SUBCUTANEOUS at 12:06

## 2024-11-27 RX ADMIN — HYDROCHLOROTHIAZIDE 25 MG: 25 TABLET ORAL at 12:03

## 2024-11-27 RX ADMIN — LISINOPRIL 40 MG: 10 TABLET ORAL at 08:19

## 2024-11-27 RX ADMIN — ATORVASTATIN CALCIUM 80 MG: 40 TABLET, FILM COATED ORAL at 08:19

## 2024-11-27 RX ADMIN — ASPIRIN 81 MG: 81 TABLET, COATED ORAL at 08:19

## 2024-11-27 RX ADMIN — LEVOTHYROXINE SODIUM 50 MCG: 50 TABLET ORAL at 08:19

## 2024-11-27 NOTE — PROGRESS NOTES
Progress Note - Hospitalist   Name: Ralph Flowers 49 y.o. male I MRN: [id number]  Unit/Bed#: S -01 I Date of Admission: 11/26/2024   Date of Service: 11/27/2024 I Hospital Day: 0    Assessment & Plan  Stroke-like symptoms  Patient is a 50yo presenting from home who presented as stroke alert this a.m. and LKW was on sunday. Initial presenting deficits were: Ambulatory dysfunction due to decreased coordination of his left greater than right leg.  CURRENT SXS patient is still having gait imbalance due to decreased coordination, no headaches, dizziness, lightheadedness or weakness.  No focal deficits noted    EKG ED: Normal sinus rhythm, pulse 80s  CTA head and neck: No LVO, age-indeterminate lacunar infarct in the left mary beth, no stenosis of cervical carotid arteries, small left vertebral artery with proximal occlusion of the left V4 segment that could be chronic.    NIHSS 0  Lab Results   Component Value Date    HGBA1C 8.3 (H) 04/02/2024      Lab Results   Component Value Date    JPG4JKZMKSGD 1.909 11/26/2024      Lab Results   Component Value Date    CHOLESTEROL 198 11/27/2024    TRIG 142 11/27/2024    HDL 38 (L) 11/27/2024    LDLCALC 132 (H) 11/27/2024         Plan:  Neuro following  May aim for normotension, as below  Repeat CTH if GCS drops 2pts in 1hr  Follow-up MRI brain w/o contrast, A1c  Atorvastatin 80 mg daily, aspirin 81 mg daily  Eliquis 5 mg twice daily  - Outpatient neurological follow up   Hypertensive urgency  Blood Pressure: (!) 195/103 patient hypertensive to 220s on arrival, patient has been noncompliant with antihypertensive regimen including daily amlodipine 10 mg, diltiazem 240 mg, lisinopril 40 mg (obtained via chart review).  Per neuro, okay to aim for normotension., however will pursue 25% or less reduction of BP since patient has likely been chronically hypertensive for months.  Holding amlodipine and reducing dose of diltiazem at this time.    Plan:  Continue lisinopril 40 mg  Start  "diltiazem 120 mg  Start HCTZ 25 mg  Hold amlodipine 10 mg  Paroxysmal atrial fibrillation (HCC)  Pulse: 60   Rate control: Diltiazem 125 mg daily  AC: Holding patient's warfarin 7.5 daily, will price check Eliquis  Recent Labs     11/26/24  0742   INR 1.03      Plan:  Eliquis 5 mg twice daily  Continue home meds  Monitor rates/BP   Type 2 diabetes mellitus with hyperglycemia (HCC)  Lab Results   Component Value Date    HGBA1C 8.3 (H) 04/02/2024       Recent Labs     11/26/24  1138 11/26/24  1546 11/26/24  2152 11/27/24  0738   POCGLU 162* 183* 150* 128     Blood Sugar Average: Last 72 hrs: (P) 181  Hold home metformin while inpatient and resume on discharge, however suspecting patient may need insulin upon discharge  Maintain glucose <180, SSI for coverage if indicated    Plan:  Diabetic diet  Insulin regimen-Sliding scale  Glucose checks and Insulin correction ACHS  Goal -180 while admitted, adjusting insulin regimen as appropriate  Monitor for hypoglycemia and treat per protocol   Morbid (severe) obesity due to excess calories (HCC)  Estimated body mass index is 42.47 kg/m² as calculated from the following:    Height as of this encounter: 5' 10\" (1.778 m).    Weight as of this encounter: 134 kg (296 lb).   Body mass index is 42.47 kg/m².    Recommend incorporating a more whole foods plant-predominant diet along with decreasing consumption of red meats and processed foods  Per AHA guidelines, recommend moderate-vigorous intensity exercise for 30 minutes a day for 5 days a week or a total of 150 min/week   History of noncompliance with medical treatment  Patient has not taken medications for 3 months, states difficulty with make it to his PCP appointments due to work restrictions.    Plan:  Case management consulted  Essential hypertension  See plan for hypertensive urgency  Hypothyroidism  Continue PTA levothyroxine, follow-up TSH as above  Obstructive sleep apnea  Patient may need referral for sleep " study  Hyperlipidemia  Continue PTA atorvastatin    VTE Pharmacologic Prophylaxis:   Moderate Risk (Score 3-4) - Pharmacological DVT Prophylaxis Ordered: apixaban (Eliquis).    Mobility:   Basic Mobility Inpatient Raw Score: 24  JH-HLM Goal: 8: Walk 250 feet or more  JH-HLM Achieved: 6: Walk 10 steps or more  JH-HLM Goal NOT achieved. Continue with multidisciplinary rounding and encourage appropriate mobility to improve upon JH-HLM goals.    Patient Centered Rounds: I performed bedside rounds with nursing staff today.   Discussions with Specialists or Other Care Team Provider: PT/OT    Education and Discussions with Family / Patient: Patient declined call to .     Current Length of Stay: 0 day(s)  Current Patient Status: Observation   Certification Statement: The patient will continue to require additional inpatient hospital stay due to HTN emergency  Discharge Plan: Anticipate discharge in 24-48 hrs to home.    Code Status: Level 1 - Full Code    Subjective   No acute events overnight.    Objective :  Temp:  [97.4 °F (36.3 °C)-98.5 °F (36.9 °C)] 98.2 °F (36.8 °C)  HR:  [48-80] 60  BP: (119-215)/() 195/103  Resp:  [17-31] 20  SpO2:  [82 %-99 %] 96 %  O2 Device: None (Room air)  Nasal Cannula O2 Flow Rate (L/min):  [2 L/min] 2 L/min    Body mass index is 42.47 kg/m².     Input and Output Summary (last 24 hours):     Intake/Output Summary (Last 24 hours) at 11/27/2024 0755  Last data filed at 11/27/2024 0702  Gross per 24 hour   Intake 570 ml   Output 1970 ml   Net -1400 ml       Physical Exam  Vitals and nursing note reviewed.   Constitutional:       General: He is not in acute distress.     Appearance: He is well-developed.   HENT:      Head: Normocephalic and atraumatic.   Eyes:      Conjunctiva/sclera: Conjunctivae normal.   Cardiovascular:      Rate and Rhythm: Normal rate and regular rhythm.      Heart sounds: No murmur heard.  Pulmonary:      Effort: Pulmonary effort is normal. No  respiratory distress.      Breath sounds: Normal breath sounds. No wheezing.   Abdominal:      General: Bowel sounds are normal.      Palpations: Abdomen is soft.      Tenderness: There is no abdominal tenderness.   Musculoskeletal:         General: No swelling.      Cervical back: Neck supple.      Right lower leg: No edema.      Left lower leg: No edema.   Skin:     General: Skin is warm and dry.      Capillary Refill: Capillary refill takes less than 2 seconds.   Neurological:      General: No focal deficit present.      Mental Status: He is alert and oriented to person, place, and time. Mental status is at baseline.   Psychiatric:         Mood and Affect: Mood normal.         Lines/Drains:        Telemetry:  Telemetry Orders (From admission, onward)               24 Hour Telemetry Monitoring  Continuous x 24 Hours (Telem)        Expiring   Question:  Reason for 24 Hour Telemetry  Answer:  TIA/Suspected CVA/ Confirmed CVA                     Telemetry Reviewed: Normal Sinus Rhythm  Indication for Continued Telemetry Use: Acute MI/Unstable Angina/Rule out ACS               Lab Results: I have reviewed the following results:   Results from last 7 days   Lab Units 11/27/24  0432   WBC Thousand/uL 8.89   HEMOGLOBIN g/dL 14.2   HEMATOCRIT % 43.4   PLATELETS Thousands/uL 222     Results from last 7 days   Lab Units 11/27/24  0432   SODIUM mmol/L 139   POTASSIUM mmol/L 4.5   CHLORIDE mmol/L 107   CO2 mmol/L 25   BUN mg/dL 16   CREATININE mg/dL 1.03   ANION GAP mmol/L 7   CALCIUM mg/dL 9.1   GLUCOSE RANDOM mg/dL 121     Results from last 7 days   Lab Units 11/26/24  0742   INR  1.03     Results from last 7 days   Lab Units 11/27/24  0738 11/26/24  2152 11/26/24  1546 11/26/24  1138 11/26/24  0738   POC GLUCOSE mg/dl 128 150* 183* 162* 282*               Recent Cultures (last 7 days):         Imaging Results Review: No pertinent imaging studies reviewed.  Other Study Results Review: No additional pertinent studies  reviewed.    Last 24 Hours Medication List:     Current Facility-Administered Medications:     acetaminophen (TYLENOL) tablet 650 mg, Q6H PRN    apixaban (ELIQUIS) tablet 5 mg, BID    aspirin (ECOTRIN LOW STRENGTH) EC tablet 81 mg, Daily    atorvastatin (LIPITOR) tablet 80 mg, Daily    diltiazem (CARDIZEM CD) 24 hr capsule 120 mg, Daily    hydrALAZINE (APRESOLINE) injection 10 mg, Q6H PRN    insulin lispro (HumALOG/ADMELOG) 100 units/mL subcutaneous injection 2-12 Units, TID AC **AND** Fingerstick Glucose (POCT), TID AC    levothyroxine tablet 50 mcg, Daily    lisinopril (ZESTRIL) tablet 40 mg, Daily    polyethylene glycol (MIRALAX) packet 17 g, Daily    Administrative Statements   Today, Patient Was Seen By: Pipe Carnes MD    **Please Note: This note may have been constructed using a voice recognition system.**

## 2024-11-27 NOTE — ASSESSMENT & PLAN NOTE
Lab Results   Component Value Date    HGBA1C 8.3 (H) 04/02/2024       Recent Labs     11/26/24  1138 11/26/24  1546 11/26/24  2152 11/27/24  0738   POCGLU 162* 183* 150* 128     Blood Sugar Average: Last 72 hrs: (P) 181  Hold home metformin while inpatient and resume on discharge, however suspecting patient may need insulin upon discharge  Maintain glucose <180, SSI for coverage if indicated    Plan:  Diabetic diet  Insulin regimen-Sliding scale  Glucose checks and Insulin correction ACHS  Goal -180 while admitted, adjusting insulin regimen as appropriate  Monitor for hypoglycemia and treat per protocol

## 2024-11-27 NOTE — ASSESSMENT & PLAN NOTE
"Patient with hx of stroke in 2021 (residual right sided facial numbness) presents with symptoms described as feeling \"off balance,\" drifting toward left side while walking since Sunday. LKW was Sunday morning. NIHSS = 0.     11/26/24 CTH:  No acute large vessel distribution infarct, hemorrhage, mass effect, shift or herniation. Age indeterminate lacunar infarct in the left mary beth that may correspond with finding described on outside MRI. White matter changes likely due to chronic microangiopathy.    11/26/24 CTA: No significant stenosis of the cervical carotid arteries.Small left vertebral artery with proximal occlusion of the left V4 segment that could be chronic. Patent left PICA that arises from the distal V3 segment.No other large vessel occlusion. Severe stenosis in the right A2 segment and proximal left A3 segment. Mild narrowing in the M1 segment. Multifocal moderate and severe stenosis of the bilateral M2 branches, left worse than right. Severe focal stenosis in the dominant right vertebral artery. Severe stenosis in the bilateral P2 segments.     11/27/24 MRI brain: Motion-degraded examination including the DWI sequence limiting evaluation. Small area of acute to subacute infarct within the left lateral medulla. Additional artifact versus acute to subacute infarct within the left posterior frontal lobe. 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 T2/FLAIR hyperintensities within the periventricular and deep white matter, nonspecific however distribution may be suggestive of demyelinating disease in the correct clinical setting. Differential includes precocious microangiopathic changes, infectious/inflammatory process among other etiologies.    11/26/24 TTE:  Left ventricular cavity size is mildly dilated. Wall thickness is mildly increased. The left ventricular ejection fraction is 50-55%. Systolic function is low normal. Although no diagnostic regional wall motion abnormality was " identified, this possibility cannot be completely excluded on the basis of this study. Diastolic function is normal.  Right ventricular cavity size is dilated. Systolic function is normal.    Assessment: suspect acute infarct within left lateral medulla and left posterior frontal lobe, likely embolic in nature secondary to uncontrolled Afib in the setting of medication noncompliance    Plan:  Lipid panel: , A1C 12.9  Continue Eliquis 5mg BID, Lipitor 80 mg daily, ASA 81 mg daily - please price check Eliquis vs other doac's vs warfarin to ensure patient can afford medication for compliance  PT/OT/ST/swallow eval   BP goals < 180 systolic

## 2024-11-27 NOTE — PLAN OF CARE
Problem: PHYSICAL THERAPY ADULT  Goal: Performs mobility at highest level of function for planned discharge setting.  See evaluation for individualized goals.  Description: Treatment/Interventions: Functional transfer training, LE strengthening/ROM, Elevations, Therapeutic exercise, Patient/family training, Equipment eval/education, Bed mobility, Gait training, Spoke to case management, Spoke to nursing, OT  Equipment Recommended: Walker       See flowsheet documentation for full assessment, interventions and recommendations.  Note: Prognosis: Fair  Problem List: Decreased strength, Decreased endurance, Impaired balance, Decreased mobility, Obesity  Assessment: Pt is a 49 y.o. male seen for PT evaluation s/p admit to [facility] on 11/26/2024. Pt was admitted with a primary dx of: paroxysmal atrial fibrillation, HTN, stroke-like symptoms.  PT now consulted for assessment of mobility and d/c needs. Pt with Up and OOB as tolerated orders.  Pts current comorbidities and personal factors effecting treatment include: BMI, HTN, h/o noncompliance with medical treatment, JAVON, DM II, HLD, history of stroke. Pts current clinical presentation is Unstable/Unpredictable (high complexity) due to Ongoing medical management for primary dx, Increased reliance on more restrictive AD compared to baseline, Decreased activity tolerance compared to baseline, Fall risk, Increased assistance needed from caregiver at current time, Ongoing telemetry monitoring, Diagnostic imaging pending. Prior to admission, pt was independent without AD. Upon evaluation, pt currently is requiring Modified Independent for bed mobility; Supervision for transfers and MaxA for ambulation 40 ft w/ RW. Pt presents at PT eval functioning below baseline and currently w/ overall mobility deficits 2* to: BLE weakness, impaired balance, gait deviations, decreased activity tolerance compared to baseline, decreased functional mobility tolerance compared to  baseline, decreased safety awareness, impaired judgement, fall risk, decreased cognition. Pt currently at a fall risk 2* to impairments listed above.  Pt will continue to benefit from skilled acute PT interventions to address stated impairments; to maximize functional mobility; for ongoing pt/ family training; and DME needs. At conclusion of PT session pt returned BTB, bed alarm engaged, all needs in reach, and RN notified of session findings/recommendations with phone and call bell within reach. Pt denies any further questions at this time. Recommend Level I (Maximum Resource Intensity)  upon hospital D/C.        Rehab Resource Intensity Level, PT: I (Maximum Resource Intensity) (vs III pending medical optimization and progression of mobility)    See flowsheet documentation for full assessment.

## 2024-11-27 NOTE — OCCUPATIONAL THERAPY NOTE
Occupational Therapy Evaluation     Patient Name: Ralph Flowers  Today's Date: 11/27/2024  Problem List  Principal Problem:    Stroke-like symptoms  Active Problems:    Essential hypertension    History of noncompliance with medical treatment    Hyperlipidemia    Hypothyroidism    Morbid (severe) obesity due to excess calories (HCC)    Obstructive sleep apnea    Paroxysmal atrial fibrillation (HCC)    Type 2 diabetes mellitus with hyperglycemia (HCC)    Hypertensive urgency    Past Medical History  Past Medical History:   Diagnosis Date    Atrial fibrillation (HCC)     Diabetes mellitus (HCC)     Hyperlipidemia     Hypertension     Obesity     Stroke (HCC)      Past Surgical History  No past surgical history on file.          11/27/24 0828   OT Last Visit   OT Visit Date 11/27/24   Note Type   Note type Evaluation +treat   Pain Assessment   Pain Assessment Tool 0-10   Pain Score No Pain   Restrictions/Precautions   Weight Bearing Precautions Per Order No   Other Precautions Chair Alarm;Bed Alarm;Fall Risk   Home Living   Type of Home House   Home Layout Stairs to enter with rails;Two level;Bed/bath upstairs  (5 DOE, FULL BATH ON 1ST FLOOR)   Bathroom Shower/Tub Tub/shower unit   Bathroom Toilet Standard   Prior Function   Level of [place] Independent with ADLs;Independent with functional mobility   Lives With Alone   IADLs Independent with driving;Independent with meal prep;Independent with medication management   Falls in the last 6 months 0   Vocational Full time employment   Lifestyle   Autonomy Patient lives alone in a two-story home, 5 steps to enter, occasionally has his young son with him.  Patient reports bedroom on second floor and full bath on first floor, he was independent for all I/ADLs, independent ambulation, + working/, no DME   Service to Others Full-time employed   General   Family/Caregiver Present No   ADL   Eating Assistance 7  Independent   Grooming Assistance 7  Independent    UB Dressing Assistance 7  Independent   LB Dressing Assistance 6  Modified independent   LB Dressing Deficit Don/doff R shoe;Don/doff L shoe   Toileting Deficit   (Declines need at this time)   Bed Mobility   Supine to Sit 6  Modified independent   Additional items HOB elevated;Increased time required   Sit to Supine 6  Modified independent   Additional items HOB elevated;Increased time required   Transfers   Sit to Stand 5  Supervision   Stand to Sit 5  Supervision   Additional Comments No LOB noted in standiing   Activity Tolerance   Activity Tolerance Patient limited by fatigue   Medical Staff Made Aware Juanjo PT   Nurse Made Aware PRASHANT lorenzo pre/post   RUE Assessment   RUE Assessment WFL   LUE Assessment   LUE Assessment WFL   Hand Function   Fine Motor Coordination Functional   Sensation   Light Touch No apparent deficits   Proprioception   Proprioception No apparent deficits   Cognition   Overall Cognitive Status WFL   Arousal/Participation Alert;Cooperative   Attention Within functional limits   Orientation Level Oriented X4   Memory Within functional limits   Following Commands Follows all commands and directions without difficulty   Assessment   Assessment Pt is a 49 y.o. male seen for OT evaluation s/p admission to [facility] on 11/26/2024 due to paroxysmal atrial fibrillation, HTN, stroke-like symptoms. Diagnosed with Stroke-like symptoms. Personal and env factors supporting pt at time of IE include age, (I) PLOF, and attitude towards recovery.  PMH: BMI, HTN, h/o noncompliance with medical treatment, JAVON, DM II, HLD, history of stroke . Pt was living with alone in a [address] [facility] for I/ADLs, Indep mobility w/o AD. +working/.  Pt presents today with bilateral lower extremity weakness, impaired balance and standing, gait deviations impaired judgment, fall risk and decreased safety awareness affecting independence and safety with all functional tasks. Despite pt's current  functional limitations and medical complications pt is functioning at baseline. No further acute OT needs identified at this time. Recommend continued active ADL participation and mobilization with hospital staff while in the hospital to increase pt’s endurance and strength upon D/C. From OT standpoint, recommend D/C to prior living environment when medically cleared. D/C pt from OT caseload at this time.   Goals   Patient Goals to get better   Plan   Treatment Interventions ADL retraining;Functional transfer training;UE strengthening/ROM;Endurance training;Patient/family training;Equipment evaluation/education;Fine motor coordination activities;Continued evaluation;Energy conservation;Activityengagement   OT Treatment Day 0   OT Frequency Eval only   Discharge Recommendation   Rehab Resource Intensity Level, OT No post-acute rehabilitation needs   AM-PAC Daily Activity Inpatient   Lower Body Dressing 4   Bathing 4   Toileting 4   Upper Body Dressing 4   Grooming 4   Eating 4   Daily Activity Raw Score 24   Daily Activity Standardized Score (Calc for Raw Score >=11) 57.54   AM-PAC Applied Cognition Inpatient   Following a Speech/Presentation 4   Understanding Ordinary Conversation 4   Taking Medications 4   Remembering Where Things Are Placed or Put Away 4   Remembering List of 4-5 Errands 4   Taking Care of Complicated Tasks 4   Applied Cognition Raw Score 24   Applied Cognition Standardized Score 62.21   Additional Treatment Session   Start Time 0839   End Time 0849   Treatment Assessment Patient seen for OT follow-up treatment session with focus on self-help skills and functional ambulation safety.  Patient was living alone in a two-story home at independent level for all I/ADLs, independent ambulation without adaptive device and working full-time.  Patient presents today with gait deviations and + fall risk.  Patient was able to complete bed mobility at modified independent level with elevated head of bed, sit  to stand completed at supervision level and functional ambulation performed variable min to max Ax1 with use of RW. He completes upper body ADLs at independent level, lower body ADLs performed at modified independence seated at end of bed to don and doff sneakers, patient was independent to don belt on jeans and standing position unsupported and is independent in grooming.  Patient supplied education on use of long handled reacher and need for continued use of rolling walker to decrease fall risk and increase independence, good understanding stated.  Patient is at baseline level for self-help skills, will sign off for OT at this time.  No further OT recommended after discharge   Additional Treatment Day 1   End of Consult   Education Provided Yes   Patient Position at End of Consult Supine;Bed/Chair alarm activated   Nurse Communication Nurse aware of consult     This session, pt required and most appropriately benefited from skilled OT/PT co-treatment due to extensive physical assistance of SKILLED therapists, significant regression from functional baseline, and decreased activity tolerance. OT and PT goals were addressed separately as seen in documentation.

## 2024-11-27 NOTE — UTILIZATION REVIEW
"Initial Clinical Review    Admission: Date/Time/Statement:   Admission Orders (From admission, onward)       Ordered        11/26/24 0847  Place in Observation  Once                          Orders Placed This Encounter   Procedures    Place in Observation     Standing Status:   Standing     Number of Occurrences:   1     Level of Care:   Med Surg [16]     ED Arrival Information       Expected   -    Arrival   11/26/2024 07:26    Acuity   Emergent              Means of arrival   Ambulance    Escorted by   [facility]    Service   Hospitalist    Admission type   Emergency              Arrival complaint   Stroke Alert             Chief Complaint   Patient presents with    STROKE Alert     Pre hospital stroke alert        Initial Presentation: 49 y.o. male with a PMH of acute CVA, paroxysmal A-fib on Coumadin, uncontrolled hypertension and hyperlipidemia, morbid obesity, JAVON, type 2 diabetes overall medication noncompliance who presents with 2 day history of imbalance \"walks to the left\" sensation of his left leg is shorter. In December 2021, presented with slurred speech and right facial numbness  MRI brain showed white matter lesions suspicious for acute CV, started on Coumadin and aspirin, started on insulin and atorvastatin. 3 months of not taking any of his medications. Non compliance because hasn't seen his PCP in at least a year, difficulty due to his work hours. Patient works at a [employer], he stated that he has been having gait dysfunction since Sunday, he initially did not want to see a doctor but was urged at the behest of his coworkers. His gait dysfunction has remained relatively stable since the onset of on Sunday. He states he was not doing anything special in particular, just noted that his coordination was \"off\". Plan: Observation for stroke like symptoms, hypertensive urgency, Afib, DM, morbid obesity, hx of non compliance, hypothyroidism, JAVON, HLD: Neuro consult, bedside speech eval, stroke " pathway, neuro checks, lipid panel and HgbA1c, TSH, MRI brain, TTE, atorvastatin, ASA 81 mg, Eliquis, continue lisinopril, start diltiazem 120 mg, hold home warfarin, diabetic diet with SSI, continue levothyroxine.     Neurology consult: stroke pathway, MRI brain, lipid panel and HgbA1c, recommend doac vs warfarin, ASA 81 mg daily, statin, PT/OT/ST/swallow eval, neuro checks.    11/27:    Internal medicine: Follow MRI brain, HgbA1c, continue atorvastatin 80 mg, ASA 81 mg, Eliquis bid, continue lisinopril, start diltiazem 120 mg and HCTZ 25 mg, hold amlodipine and warfarin, diabetic diet with SSI, levothyroxine and atorvastatin.     Neurology: Suspect embolic strokes in the setting of underlying Afib with AC non-compliance. L V4 occlusion seen on CTA may represent acute thrombus and contribute to the L medullary stroke. Continue AC as noted; if unable to afford apixaban would prefer another DOAC and if unable then warfarin. Educated on importance of compliance going forward and risk factor control. To reduce risk of hemorrhage, recommend SBP goal at least <180 however ultimately can gradually reduce to normotension at this point     ED Treatment-Medication Administration from 11/26/2024 0714 to 11/26/2024 1737         Date/Time Order Dose Route Action     11/26/2024 0733 iohexol (OMNIPAQUE) 350 MG/ML injection (MULTI-DOSE) 85 mL 85 mL Intravenous Given     11/26/2024 1224 aspirin (ECOTRIN LOW STRENGTH) EC tablet 81 mg 81 mg Oral Given     11/26/2024 1223 atorvastatin (LIPITOR) tablet 80 mg 80 mg Oral Given     11/26/2024 1222 diltiazem (CARDIZEM CD) 24 hr capsule 120 mg 120 mg Oral Given     11/26/2024 1224 levothyroxine tablet 50 mcg 50 mcg Oral Given     11/26/2024 1223 lisinopril (ZESTRIL) tablet 40 mg 40 mg Oral Given     11/26/2024 1224 apixaban (ELIQUIS) tablet 5 mg 5 mg Oral Given     11/26/2024 1221 insulin lispro (HumALOG/ADMELOG) 100 units/mL subcutaneous injection 2-12 Units 2 Units Subcutaneous Given      11/26/2024 1700 insulin lispro (HumALOG/ADMELOG) 100 units/mL subcutaneous injection 2-12 Units 2 Units Subcutaneous Given     11/26/2024 1500 perflutren lipid microsphere (DEFINITY) injection 0.6 mL/min Intravenous Given            Scheduled Medications:  apixaban, 5 mg, Oral, BID  aspirin, 81 mg, Oral, Daily  atorvastatin, 80 mg, Oral, Daily  diltiazem, 120 mg, Oral, Daily  insulin lispro, 2-12 Units, Subcutaneous, TID AC  levothyroxine, 50 mcg, Oral, Daily  lisinopril, 40 mg, Oral, Daily  polyethylene glycol, 17 g, Oral, Daily      Continuous IV Infusions:     PRN Meds:  acetaminophen, 650 mg, Oral, Q6H PRN  hydrALAZINE, 10 mg, Intravenous, Q6H PRN      ED Triage Vitals   Temperature Pulse Respirations Blood Pressure SpO2 Pain Score   11/26/24 0750 11/26/24 0743 11/26/24 0743 11/26/24 0743 11/26/24 0743 11/26/24 0743   98 °F (36.7 °C) 83 22 (!) 212/97 98 % No Pain     Weight (last 2 days)       Date/Time Weight    11/26/24 1429 134 (296)    11/26/24 1100 135 (296.74)    11/26/24 0749 135 (296.74)            Vital Signs (last 3 days)       Date/Time Temp Pulse Resp BP MAP (mmHg) SpO2 Calculated FIO2 (%) - Nasal Cannula Nasal Cannula O2 Flow Rate (L/min) O2 Device Patient Position - Orthostatic VS Kait Coma Scale Score Pain    11/27/24 07:36:30 98.2 °F (36.8 °C) 60 -- 195/103 134 96 % -- -- -- -- -- --    11/27/24 0530 97.5 °F (36.4 °C) 50 20 166/95 112 -- -- -- -- Lying -- --    11/27/24 0330 97.7 °F (36.5 °C) 55 20 168/91 117 97 % -- -- -- Lying -- --    11/27/24 0130 97.6 °F (36.4 °C) 48 20 166/80 109 97 % -- -- None (Room air) Lying -- --    11/26/24 23:32:22 97.8 °F (36.6 °C) 53 17 168/81 110 97 % -- -- -- -- -- --    11/26/24 2215 -- -- -- 162/102 -- -- -- -- -- Lying -- --    11/26/24 21:42:56 -- 56 -- 178/97 124 96 % -- -- -- -- -- --    11/26/24 2130 -- -- 19 178/97 124 -- -- -- -- Lying -- --    11/26/24 2030 98.5 °F (36.9 °C) -- 18 177/95 122 -- -- -- -- Lying -- --    11/26/24 1930 97.4 °F (36.3 °C)  60 18 176/87 116 -- -- -- None (Room air) Lying 15 No Pain    11/26/24 18:30:13 -- -- -- 151/97 115 -- -- -- -- -- -- --    11/26/24 1824 -- -- -- -- -- -- -- -- None (Room air) -- 15 No Pain    11/26/24 18:04:41 97.8 °F (36.6 °C) -- -- 163/94 117 -- -- -- -- -- -- --    11/26/24 1700 -- 59 20 171/82 118 98 % 28 2 L/min Nasal cannula Lying -- --    11/26/24 1600 -- 57 19 167/84 119 97 % 28 2 L/min Nasal cannula Lying -- --    11/26/24 1557 -- -- -- -- -- 99 % 28 2 L/min Nasal cannula -- -- --    11/26/24 1556 -- -- -- -- -- 82 % -- -- None (Room air) -- -- --    11/26/24 1500 -- 60 20 157/69 99 98 % -- -- None (Room air) Lying 15 --    11/26/24 1429 -- 66 -- 119/58 -- -- -- -- -- -- -- --    11/26/24 1414 -- -- -- -- -- -- -- -- -- -- -- No Pain    11/26/24 1400 -- 66 28 119/58 84 97 % -- -- None (Room air) Lying -- --    11/26/24 1315 -- 80 22 158/88 117 96 % -- -- None (Room air) Lying -- --    11/26/24 1245 -- 65 31 172/77 111 97 % -- -- None (Room air) Lying -- --    11/26/24 1145 -- -- -- 176/97 127 -- -- -- -- Lying -- --    11/26/24 1121 -- -- -- -- -- -- -- -- -- -- 15 --    11/26/24 1119 -- -- -- 180/99 -- -- -- -- -- Standing - Orthostatic VS -- --    11/26/24 1117 -- -- -- 176/97 -- -- -- -- -- Sitting - Orthostatic VS -- --    11/26/24 1115 -- 74 25 171/94 123 97 % -- -- None (Room air) Lying -- --    11/26/24 1100 98 °F (36.7 °C) 67 25 171/94 126 98 % -- -- None (Room air) Lying 15 No Pain    11/26/24 0931 -- 63 17 215/98 140 96 % -- -- -- -- -- --    11/26/24 0900 -- 78 18 199/98 139 96 % -- -- -- -- -- --    11/26/24 0831 -- 68 20 192/79 114 97 % -- -- -- -- -- --    11/26/24 0815 -- 80 18 182/90 128 97 % -- -- -- -- -- --    11/26/24 0800 -- 75 19 187/88 127 99 % -- -- -- -- -- --    11/26/24 0753 -- -- -- -- -- -- -- -- None (Room air) -- -- --    11/26/24 0750 98 °F (36.7 °C) 79 22 175/82 -- 98 % -- -- None (Room air) Lying -- No Pain    11/26/24 0746 -- -- -- -- -- -- -- -- -- -- 15 --    11/26/24  0743 -- 83 22 212/97 139 98 % -- -- -- Lying -- No Pain              Pertinent Labs/Diagnostic Test Results:   Radiology:  CTA stroke alert (head/neck)   Final Interpretation by E. Alec Schoenberger, MD (11/26 3700)      No significant stenosis of the cervical carotid arteries.   Small left vertebral artery with proximal occlusion of the left V4 segment that could be chronic. Patent left PICA that arises from the distal V3 segment.   No other large vessel occlusion.   Multifocal intracranial stenosis as above.      Findings were reported to Chuy Osborne  via HIPAA compliant secure electronic messaging at 7:49 a.m.         Workstation performed: [id number]         CT stroke alert brain   Final Interpretation by E. Alec Schoenberger, MD (11/26 0801)      No acute large vessel distribution infarct, hemorrhage, mass effect, shift or herniation. Age indeterminate lacunar infarct in the left mary beth that may correspond with finding described on outside MRI.   White matter changes likely due to chronic microangiopathy.         Findings were reported to Chuy Osborne  via HIPAA compliant secure electronic messaging at 7:49 a.m.         Workstation performed: [id number]         MRI brain wo contrast    (Results Pending)     Cardiology:  No orders to display     GI:  No orders to display           Results from last 7 days   Lab Units 11/27/24  0432 11/26/24  0742   WBC Thousand/uL 8.89 7.62   HEMOGLOBIN g/dL 14.2 14.6   HEMATOCRIT % 43.4 44.1   PLATELETS Thousands/uL 222 215         Results from last 7 days   Lab Units 11/27/24  0432 11/26/24  0742   SODIUM mmol/L 139 135   POTASSIUM mmol/L 4.5 3.8   CHLORIDE mmol/L 107 104   CO2 mmol/L 25 23   ANION GAP mmol/L 7 8   BUN mg/dL 16 21   CREATININE mg/dL 1.03 1.17   EGFR ml/min/1.73sq m 85 73   CALCIUM mg/dL 9.1 9.0   MAGNESIUM mg/dL 1.9  --          Results from last 7 days   Lab Units 11/27/24  0738 11/26/24  2152 11/26/24  1546 11/26/24  1138 11/26/24  0738   POC GLUCOSE  mg/dl 128 150* 183* 162* 282*     Results from last 7 days   Lab Units 11/27/24  0432 11/26/24  0742   GLUCOSE RANDOM mg/dL 121 274*           Results from last 7 days   Lab Units 11/26/24  1142 11/26/24  0952 11/26/24  0742   HS TNI 0HR ng/L  --   --  8   HS TNI 2HR ng/L  --  9  --    HSTNI D2 ng/L  --  1  --    HS TNI 4HR ng/L 9  --   --    HSTNI D4 ng/L 1  --   --          Results from last 7 days   Lab Units 11/26/24  0742   PROTIME seconds 14.2   INR  1.03   PTT seconds 29     Results from last 7 days   Lab Units 11/26/24  0952   TSH 3RD GENERATON uIU/mL 1.909         Past Medical History:   Diagnosis Date    Atrial fibrillation (HCC)     Diabetes mellitus (HCC)     Hyperlipidemia     Hypertension     Obesity     Stroke (HCC)      Present on Admission:   Morbid (severe) obesity due to excess calories (HCC)   Paroxysmal atrial fibrillation (HCC)   Type 2 diabetes mellitus with hyperglycemia (HCC)   Essential hypertension   Hypothyroidism   Obstructive sleep apnea   Hyperlipidemia      Admitting Diagnosis: Paroxysmal atrial fibrillation (HCC) [I48.0]  Hypertension [I10]  Stroke-like symptoms [R29.90]  Age/Sex: 49 y.o. male    Network Utilization Review Department  ATTENTION: Please call with any questions or concerns to [phone] and carefully listen to the prompts so that you are directed to the right person. All voicemails are confidential.   For Discharge needs, contact Care Management DC Support Team at [phone] opt. 2  Send all requests for admission clinical reviews, approved or denied determinations and any other requests to dedicated fax number below belonging to the campus where the patient is receiving treatment. List of dedicated fax numbers for the Facilities:  FACILITY NAME UR FAX NUMBER   ADMISSION DENIALS (Administrative/Medical Necessity) [phone]   DISCHARGE SUPPORT TEAM (NETWORK) [phone]   PARENT CHILD HEALTH (Maternity/NICU/Pediatrics) [phone]   [facility]  [facility] [phone]   [facility] [phone]   [facility] [phone]   [facility] [phone]   [facility] [phone]   [facility] [phone]   [facility] [phone]   [facility] [phone]   [facility] [phone]   [facility] [phone]   [facility] [phone]   [facility] [phone]

## 2024-11-27 NOTE — DISCHARGE INSTR - AVS FIRST PAGE
Dear Ralph Flowers,     It was our pleasure to care for you here at [facility].  It is our hope that we were always able to exceed the expected standards for your care during your stay.  You were hospitalized due to hypertensive urgency.  You were cared for on the third floor by Enrique Haider MD under the service of Yo Little MD with the [facility] who covers for your primary care physician (PCP), Xiomara Stinson DO, while you were hospitalized.  If you have any questions or concerns related to this hospitalization, you may contact us at .  For follow up as well as any medication refills, we recommend that you follow up with your primary care physician.  A registered nurse will reach out to you by phone within a few days after your discharge to answer any additional questions that you may have after going home.  However, at this time we provide for you here, the most important instructions / recommendations at discharge:     Notable Medication Adjustments -   Please start taking diltiazem (Cardizem) 120 mg daily.  Please start taking hydrochlorothiazide 25 mg daily.  Please start taking Eliquis 5 mg twice daily.  Decrease amlodipine (Norvasc) 5 mg.  Please stop taking warfarin (Coumadin) 7.5 mg.  Start Metformin 1000 mg twice daily.  Stop taking Syngardi  Testing Required after Discharge -   Blood pressure readings by your primary care physician.  ** Please contact your PCP to request testing orders for any of the testing recommended here **  Important follow up information -   Please follow-up with the PCP within 1 week.  Please follow up with Neurology in the outpatient setting.  Please call [phone] to make an appointment.  Other Instructions -   If you have further episodes of weakness, ambulatory/gait dysfunction or trouble walking, loss of strength or sensation, please come to the ED.  Please review this entire after visit  summary as additional general instructions including medication list, appointments, activity, diet, any pertinent wound care, and other additional recommendations from your care team that may be provided for you.      Sincerely,     Enrique Haider MD

## 2024-11-27 NOTE — CASE MANAGEMENT
Case Management Progress Note    Patient name Ralph Flowers  Location S /S -01 MRN [id number]  : 1975 Date 2024       LOS (days): 0  Geometric Mean LOS (GMLOS) (days):   Days to GMLOS:        OBJECTIVE:        Current admission status: Observation  Preferred Pharmacy:   [id number] - [place], [place] - [address]  [address] [place] [address]  Phone: [phone] Fax: [phone]    [id number] [place] - [address]  [address]  [place] [address]  Phone: [phone] Fax: [phone]    Primary Care Provider: Xiomara Stinson DO    Primary Insurance: [employer]  Secondary Insurance:     PROGRESS NOTE:    Per message received from [employer] via parachute - RW cleared to be delivered to patient and patient a deductible that has not been met yet, therefore cost of RW is $97.77. CM notified patient of 97.77 RW cost - patient agreeable to cost of DME. CM delivered RW to patient bedside from consignment closet. Signed delivery form placed in [employer] folder on unit for [employer] liaison and delivery form copy provided to patient for his records.

## 2024-11-27 NOTE — PROGRESS NOTES
"Progress Note - Neurology   Name: Ralph Flowers 49 y.o. male I MRN: [id number]  Unit/Bed#: S -01 I Date of Admission: 11/26/2024   Date of Service: 11/27/2024 I Hospital Day: 0    Assessment & Plan  Stroke-like symptoms  Patient with hx of stroke in 2021 (residual right sided facial numbness) presents with symptoms described as feeling \"off balance,\" drifting toward left side while walking since Sunday. LKW was Sunday morning. NIHSS = 0.     11/26/24 CTH:  No acute large vessel distribution infarct, hemorrhage, mass effect, shift or herniation. Age indeterminate lacunar infarct in the left mary beth that may correspond with finding described on outside MRI. White matter changes likely due to chronic microangiopathy.    11/26/24 CTA: No significant stenosis of the cervical carotid arteries.Small left vertebral artery with proximal occlusion of the left V4 segment that could be chronic. Patent left PICA that arises from the distal V3 segment.No other large vessel occlusion. Severe stenosis in the right A2 segment and proximal left A3 segment. Mild narrowing in the M1 segment. Multifocal moderate and severe stenosis of the bilateral M2 branches, left worse than right. Severe focal stenosis in the dominant right vertebral artery. Severe stenosis in the bilateral P2 segments.     11/27/24 MRI brain: Motion-degraded examination including the DWI sequence limiting evaluation. Small area of acute to subacute infarct within the left lateral medulla. Additional artifact versus acute to subacute infarct within the left posterior frontal lobe. 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 T2/FLAIR hyperintensities within the periventricular and deep white matter, nonspecific however distribution may be suggestive of demyelinating disease in the correct clinical setting. Differential includes precocious microangiopathic changes, infectious/inflammatory process among other etiologies.    11/26/24 TTE:  Left " ventricular cavity size is mildly dilated. Wall thickness is mildly increased. The left ventricular ejection fraction is 50-55%. Systolic function is low normal. Although no diagnostic regional wall motion abnormality was identified, this possibility cannot be completely excluded on the basis of this study. Diastolic function is normal.  Right ventricular cavity size is dilated. Systolic function is normal.    Assessment: suspect acute infarct within left lateral medulla and left posterior frontal lobe, likely embolic in nature secondary to uncontrolled Afib in the setting of medication noncompliance    Plan:  Lipid panel: , A1C 12.9  Continue Eliquis 5mg BID, Lipitor 80 mg daily, ASA 81 mg daily - please price check Eliquis vs other doac's vs warfarin to ensure patient can afford medication for compliance  PT/OT/ST/swallow eval   BP goals < 180 systolic    Morbid (severe) obesity due to excess calories (HCC)    Paroxysmal atrial fibrillation (HCC)    Type 2 diabetes mellitus with hyperglycemia (HCC)  Lab Results   Component Value Date    HGBA1C 8.3 (H) 04/02/2024       Recent Labs     11/26/24  1138 11/26/24  1546 11/26/24  2152 11/27/24  0738   POCGLU 162* 183* 150* 128       Blood Sugar Average: Last 72 hrs:  (P) 181    Hypertensive urgency    Essential hypertension    History of noncompliance with medical treatment    Hyperlipidemia    Hypothyroidism    Obstructive sleep apnea      Raplh Flowers will require outpatient follow up with general neurologist. He will not require outpatient neurological testing.  Neurology service signing off. Please contact the SecureChat role for the Neurology service with any questions/concerns.    Subjective   Patient has no acute complaints today. Reports he got up to use the bathroom once last night and reported continued unsteadiness and drifting toward the left side. Denies any dizziness, lightheadedness, weakness, paresthesias, speech changes, or visual  changes.    Review of Systems   Constitutional:  Negative for chills and fever.   HENT:  Negative for ear pain and sore throat.    Eyes:  Negative for pain and visual disturbance.   Respiratory:  Negative for cough and shortness of breath.    Cardiovascular:  Negative for chest pain and palpitations.   Gastrointestinal:  Negative for abdominal pain and vomiting.   Genitourinary:  Negative for dysuria and hematuria.   Musculoskeletal:  Positive for gait problem. Negative for arthralgias and back pain.   Skin:  Negative for color change and rash.   Neurological:  Negative for seizures and syncope.   Psychiatric/Behavioral:  Negative for confusion.    All other systems reviewed and are negative.      Objective :  Temp:  [97.4 °F (36.3 °C)-98.5 °F (36.9 °C)] 98.2 °F (36.8 °C)  HR:  [48-80] 60  BP: (119-215)/() 195/103  Resp:  [17-31] 20  SpO2:  [82 %-99 %] 96 %  O2 Device: None (Room air)  Nasal Cannula O2 Flow Rate (L/min):  [2 L/min] 2 L/min    Physical Exam  Eyes:      General: Lids are normal.      Extraocular Movements: Extraocular movements intact.      Pupils: Pupils are equal, round, and reactive to light.   Neurological:      Motor: Motor strength is normal.  Psychiatric:         Speech: Speech normal.     Neurological Exam  Mental Status  Awake, alert and oriented to person, place and time. Speech is normal. Language is fluent with no aphasia.    Cranial Nerves  CN II: Visual acuity is normal. Visual fields full to confrontation.  CN III, IV, VI: Extraocular movements intact bilaterally. Normal lids and orbits bilaterally. Pupils equal round and reactive to light bilaterally.  CN V: Facial sensation is normal.  CN VII: Full and symmetric facial movement.  CN VIII: Hearing is normal.  CN IX, X: Palate elevates symmetrically. Normal gag reflex.  CN XI: Shoulder shrug strength is normal.  CN XII: Tongue midline without atrophy or fasciculations.    Motor   Strength is 5/5 throughout all four  extremities.    Sensory  Light touch is normal in upper and lower extremities.     Coordination  Right: Heel-to-shin normal.    Gait    Observed walking with PT with significant unsteadiness, drifting toward the left side both with and without walker.        Lab Results: I have reviewed the following results:  Results Reviewed       Procedure Component Value Units Date/Time    Hemoglobin A1c w/EAG Estimation [[id number]]  (Abnormal) Collected: 11/27/24 0432    Lab Status: Final result Specimen: Blood from Arm, Left Updated: 11/27/24 1020     Hemoglobin A1C 12.9 %       mg/dl     Lipid Panel with Direct LDL reflex [[id number]]  (Abnormal) Collected: 11/27/24 0432    Lab Status: Final result Specimen: Blood from Arm, Left Updated: 11/27/24 0519     Cholesterol 198 mg/dL      Triglycerides 142 mg/dL      HDL, Direct 38 mg/dL      LDL Calculated 132 mg/dL     Magnesium [[id number]]  (Normal) Collected: 11/27/24 0432    Lab Status: Final result Specimen: Blood from Arm, Left Updated: 11/27/24 0519     Magnesium 1.9 mg/dL     Basic metabolic panel [[id number]]  (Abnormal) Collected: 11/27/24 0432    Lab Status: Final result Specimen: Blood from Arm, Left Updated: 11/27/24 0519     Sodium 139 mmol/L      Potassium 4.5 mmol/L      Chloride 107 mmol/L      CO2 25 mmol/L      ANION GAP 7 mmol/L      BUN 16 mg/dL      Creatinine 1.03 mg/dL      Glucose 121 mg/dL      Glucose, Fasting 121 mg/dL      Calcium 9.1 mg/dL      eGFR 85 ml/min/1.73sq m     Narrative:      National Kidney Disease Foundation guidelines for Chronic Kidney Disease (CKD):     Stage 1 with normal or high GFR (GFR > 90 mL/min/1.73 square meters)    Stage 2 Mild CKD (GFR = 60-89 mL/min/1.73 square meters)    Stage 3A Moderate CKD (GFR = 45-59 mL/min/1.73 square meters)    Stage 3B Moderate CKD (GFR = 30-44 mL/min/1.73 square meters)    Stage 4 Severe CKD (GFR = 15-29 mL/min/1.73 square meters)    Stage 5 End Stage CKD (GFR <15 mL/min/1.73 square  meters)  Note: GFR calculation is accurate only with a steady state creatinine    CBC (With Platelets) [[id number]]  (Normal) Collected: 11/27/24 0432    Lab Status: Final result Specimen: Blood from Arm, Left Updated: 11/27/24 0449     WBC 8.89 Thousand/uL      RBC 4.94 Million/uL      Hemoglobin 14.2 g/dL      Hematocrit 43.4 %      MCV 88 fL      MCH 28.7 pg      MCHC 32.7 g/dL      RDW 13.5 %      Platelets 222 Thousands/uL      MPV 10.4 fL     Fingerstick Glucose (POCT) [[id number]]  (Abnormal) Collected: 11/26/24 1546    Lab Status: Final result Specimen: Blood Updated: 11/26/24 1547     POC Glucose 183 mg/dl     HS Troponin I 4hr [[id number]]  (Normal) Collected: 11/26/24 1142    Lab Status: Final result Specimen: Blood from Arm, Right Updated: 11/26/24 1227     hs TnI 4hr 9 ng/L      Delta 4hr hsTnI 1 ng/L     TSH, 3rd generation with Free T4 reflex [[id number]]  (Normal) Collected: 11/26/24 0952    Lab Status: Final result Specimen: Blood from Arm, Right Updated: 11/26/24 1201     TSH 3RD GENERATON 1.909 uIU/mL     Fingerstick Glucose (POCT) [[id number]]  (Abnormal) Collected: 11/26/24 1138    Lab Status: Final result Specimen: Blood Updated: 11/26/24 1140     POC Glucose 162 mg/dl     HS Troponin I 2hr [[id number]]  (Normal) Collected: 11/26/24 0952    Lab Status: Final result Specimen: Blood from Arm, Right Updated: 11/26/24 1024     hs TnI 2hr 9 ng/L      Delta 2hr hsTnI 1 ng/L     HS Troponin 0hr (reflex protocol) [[id number]]  (Normal) Collected: 11/26/24 0742    Lab Status: Final result Specimen: Blood from Arm, Right Updated: 11/26/24 0832     hs TnI 0hr 8 ng/L     Basic metabolic panel [[id number]]  (Abnormal) Collected: 11/26/24 0742    Lab Status: Final result Specimen: Blood from Arm, Right Updated: 11/26/24 0823     Sodium 135 mmol/L      Potassium 3.8 mmol/L      Chloride 104 mmol/L      CO2 23 mmol/L      ANION GAP 8 mmol/L      BUN 21 mg/dL      Creatinine 1.17 mg/dL      Glucose 274 mg/dL       Calcium 9.0 mg/dL      eGFR 73 ml/min/1.73sq m     Narrative:      National Kidney Disease Foundation guidelines for Chronic Kidney Disease (CKD):     Stage 1 with normal or high GFR (GFR > 90 mL/min/1.73 square meters)    Stage 2 Mild CKD (GFR = 60-89 mL/min/1.73 square meters)    Stage 3A Moderate CKD (GFR = 45-59 mL/min/1.73 square meters)    Stage 3B Moderate CKD (GFR = 30-44 mL/min/1.73 square meters)    Stage 4 Severe CKD (GFR = 15-29 mL/min/1.73 square meters)    Stage 5 End Stage CKD (GFR <15 mL/min/1.73 square meters)  Note: GFR calculation is accurate only with a steady state creatinine    Protime-INR [[id number]]  (Normal) Collected: 11/26/24 0742    Lab Status: Final result Specimen: Blood from Arm, Right Updated: 11/26/24 0807     Protime 14.2 seconds      INR 1.03    Narrative:      INR Therapeutic Range    Indication                                             INR Range      Atrial Fibrillation                                               2.0-3.0  Hypercoagulable State                                    2.0.2.3  Left Ventricular Asist Device                            2.0-3.0  Mechanical Heart Valve                                  -    Aortic(with afib, MI, embolism, HF, LA enlargement,    and/or coagulopathy)                                     2.0-3.0 (2.5-3.5)     Mitral                                                             2.5-3.5  Prosthetic/Bioprosthetic Heart Valve               2.0-3.0  Venous thromboembolism (VTE: VT, PE        2.0-3.0    APTT [[id number]]  (Normal) Collected: 11/26/24 0742    Lab Status: Final result Specimen: Blood from Arm, Right Updated: 11/26/24 0807     PTT 29 seconds     CBC and Platelet [[id number]]  (Normal) Collected: 11/26/24 0742    Lab Status: Final result Specimen: Blood from Arm, Right Updated: 11/26/24 0759     WBC 7.62 Thousand/uL      RBC 5.04 Million/uL      Hemoglobin 14.6 g/dL      Hematocrit 44.1 %      MCV 88 fL      MCH 29.0 pg      MCHC 33.1 g/dL       RDW 13.4 %      Platelets 215 Thousands/uL      MPV 10.4 fL     Fingerstick Glucose (POCT) [[id number]]  (Abnormal) Collected: 11/26/24 0738    Lab Status: Final result Specimen: Blood Updated: 11/26/24 0743     POC Glucose 282 mg/dl             Imaging Results Review: I reviewed radiology reports from this admission including: CT head and MRI brain.  Other Study Results Review: No additional pertinent studies reviewed.    VTE Pharmacologic Prophylaxis: VTE covered by:  apixaban, Oral, 5 mg at 11/27/24 0819       Administrative Statements   Topics discussed with the patient / family include symptom assessment and management, medication review, and medication adjustment.

## 2024-11-27 NOTE — PHYSICAL THERAPY NOTE
Physical Therapy Evaluation    Patient's Name: Ralph Flowers    Admitting Diagnosis  Paroxysmal atrial fibrillation (HCC) [I48.0]  Hypertension [I10]  Stroke-like symptoms [R29.90]    Problem List  Patient Active Problem List   Diagnosis    Essential hypertension    History of noncompliance with medical treatment    Hyperlipidemia    Hypothyroidism    Morbid (severe) obesity due to excess calories (HCC)    Obstructive sleep apnea    Paroxysmal atrial fibrillation (HCC)    Type 2 diabetes mellitus with hyperglycemia (HCC)    Stroke-like symptoms    Hypertensive urgency       Past Medical History  Past Medical History:   Diagnosis Date    Atrial fibrillation (HCC)     Diabetes mellitus (HCC)     Hyperlipidemia     Hypertension     Obesity     Stroke (HCC)        Past Surgical History  No past surgical history on file.     24 0834   PT Last Visit   PT Visit Date 24   Note Type   Note type Evaluation   Pain Assessment   Pain Assessment Tool 0-10   Pain Score No Pain   Restrictions/Precautions   Weight Bearing Precautions Per Order No   Other Precautions Chair Alarm;Bed Alarm;Fall Risk   Home Living   Type of Home House   Home Layout Stairs to enter with rails;Two level;Bed/bath upstairs  (5 [address], FULL BATH ON 1ST FLOOR)   Bathroom Shower/Tub Tub/shower unit   Bathroom Toilet Standard   Prior Function   Level of [place] Independent with ADLs;Independent with functional mobility   Lives With Alone   IADLs Independent with driving;Independent with meal prep;Independent with medication management   Falls in the last 6 months 0   Vocational Full time employment   Comments at baseline pt is independent with all mobility, works full time in a [employer]   Cognition   Orientation Level Oriented X4   Following Commands Follows one step commands without difficulty   Comments pt ID by wristband, name and    Subjective   Subjective pt supine in bed, agreeable to PT eal, notes increased L LOB with  mobility   RLE Assessment   RLE Assessment WFL  (- long sit test)   Strength RLE   R Hip Flexion 5/5   R Hip ABduction 5/5   R Knee Flexion 5/5   R Knee Extension 5/5   R Ankle Dorsiflexion 5/5   R Ankle Plantar Flexion 5/5   LLE Assessment   LLE Assessment WFL  (- long sit, provided heel lift per pt request of feeling one leg is shorter than the other)   Strength LLE   L Hip Flexion 5/5   L Hip ABduction 5/5   L Knee Flexion 5/5   L Knee Extension 5/5   L Ankle Dorsiflexion 5/5   L Ankle Plantar Flexion 5/5   Light Touch   RLE Light Touch Grossly intact   LLE Light Touch Grossly intact   Bed Mobility   Supine to Sit 6  Modified independent   Additional items HOB elevated;Increased time required   Sit to Supine 6  Modified independent   Additional items HOB elevated;Increased time required   Additional Comments pt performs posterior weight shift with trunk extension and rotation to reach over bed and grab object from opposite side of bed off of floor without LOB   Transfers   Sit to Stand 5  Supervision   Stand to Sit 5  Supervision   Additional Comments stands with wide KRISTINE, no LOB noted   Ambulation/Elevation   Gait pattern Decreased foot clearance;Inconsistent anne;Foward flexed;Decreased hip extension;Decreased heel strike;Decreased toe off;Improper Weight shift  (multiple L lateral LOB, corrects with UE on RW vs wall)   Gait Assistance 2  Maximal assist  (variable min to max Ax1 in order to ambualte)   Additional items Assist x 1;Verbal cues   Assistive Device None;Rolling walker   Distance 5'x1(no AD) 35'x1(RW), 40'x1(RW), 25'x2(RW and heel lift)   Ambulation/Elevation Additional Comments (S)  pt experiences multipl L LOB both with and without AD. requires mod to max Ax1 in order to safely correct, pt denies light headedness/dizziness. he denies feeling of functional weakness.   Balance   Static Sitting Good   Dynamic Sitting Fair   Static Standing Poor +  (RW)   Dynamic Standing Poor   Ambulatory Poor  -  (RW)   Activity Tolerance   Activity Tolerance Patient limited by fatigue   Medical Staff Made Aware care coordinated with OT, SLIM resident present for parts of IE   Nurse Made Aware PRASHANT lorenzo pre/post   Assessment   Prognosis Fair   Problem List Decreased strength;Decreased endurance;Impaired balance;Decreased mobility;Obesity   Assessment Pt is a 49 y.o. male seen for PT evaluation s/p admit to [facility] on 11/26/2024. Pt was admitted with a primary dx of: paroxysmal atrial fibrillation, HTN, stroke-like symptoms.  PT now consulted for assessment of mobility and d/c needs. Pt with Up and OOB as tolerated orders.  Pts current comorbidities and personal factors effecting treatment include: BMI, HTN, h/o noncompliance with medical treatment, JAVON, DM II, HLD, history of stroke. Pts current clinical presentation is Unstable/Unpredictable (high complexity) due to Ongoing medical management for primary dx, Increased reliance on more restrictive AD compared to baseline, Decreased activity tolerance compared to baseline, Fall risk, Increased assistance needed from caregiver at current time, Ongoing telemetry monitoring, Diagnostic imaging pending. Prior to admission, pt was independent without AD. Upon evaluation, pt currently is requiring Modified Independent for bed mobility; Supervision for transfers and MaxA for ambulation 40 ft w/ RW. Pt presents at PT eval functioning below baseline and currently w/ overall mobility deficits 2* to: BLE weakness, impaired balance, gait deviations, decreased activity tolerance compared to baseline, decreased functional mobility tolerance compared to baseline, decreased safety awareness, impaired judgement, fall risk, decreased cognition. Pt currently at a fall risk 2* to impairments listed above.  Pt will continue to benefit from skilled acute PT interventions to address stated impairments; to maximize functional mobility; for ongoing pt/ family training; and DME needs.  At conclusion of PT session pt returned BTB, bed alarm engaged, all needs in reach, and RN notified of session findings/recommendations with phone and call bell within reach. Pt denies any further questions at this time. Recommend Level I (Maximum Resource Intensity)  upon hospital D/C.   Goals   Patient Goals to get better   STG Expiration Date 12/07/24   Short Term Goal #1 In 10 days pt will be able to: 1. Demonstrate ability to perform all aspects of bed mobility independently to improve functional safety.  2. Perform functional transfers independently to facilitate safe return to previous living environment.  3.  Ambulate 150 ft with LRAD independently with stable vitals to improve safety with household distances and reduce fall risk.  4. Improve LE strength grades by 1 to increase ease of functional mobility with transfers and gait. 5. Pt will demonstrate improved balance by one grade in order to decrease risk of falls. 6. Climb at least 5 steps with unilateral HR independently to simulate entrance to home.   PT Treatment Day 0   Plan   Treatment/Interventions Functional transfer training;LE strengthening/ROM;Elevations;Therapeutic exercise;Patient/family training;Equipment eval/education;Bed mobility;Gait training;Spoke to case management;Spoke to nursing;OT   PT Frequency 3-5x/wk   Discharge Recommendation   Rehab Resource Intensity Level, PT I (Maximum Resource Intensity)  (vs III pending medical optimization and progression of mobility)   Equipment Recommended Walker   Walker Package Recommended Wheeled walker   Change/add to Walker Package? Yes, Change Size   Walker Size Bariatric (Wider Frame)   AM-PAC Basic Mobility Inpatient   Turning in Flat Bed Without Bedrails 4   Lying on Back to Sitting on Edge of Flat Bed Without Bedrails 4   Moving Bed to Chair 3   Standing Up From Chair Using Arms 3   Walk in Room 1   Climb 3-5 Stairs With Railing 1   Basic Mobility Inpatient Raw Score 16   Basic Mobility  Standardized Score 38.32   [facility] Highest Level Of Mobility   -HL Goal 5: Stand one or more mins   -HLM Achieved 7: Walk 25 feet or more   End of Consult   Patient Position at End of Consult Supine;Bed/Chair alarm activated;All needs within reach   The patient's AM-PAC Basic Mobility Inpatient Short Form Raw Score is 16. A Raw score of less than or equal to 16 suggests the patient may benefit from discharge to post-acute rehabilitation services. Please also refer to the recommendation of the Physical Therapist for safe discharge planning.  Matt Riojas, PT

## 2024-11-27 NOTE — ASSESSMENT & PLAN NOTE
Pulse: 60   Rate control: Diltiazem 125 mg daily  AC: Holding patient's warfarin 7.5 daily, will price check Eliquis  Recent Labs     11/26/24  0742   INR 1.03      Plan:  Eliquis 5 mg twice daily  Continue home meds  Monitor rates/BP

## 2024-11-27 NOTE — ASSESSMENT & PLAN NOTE
Lab Results   Component Value Date    HGBA1C 8.3 (H) 04/02/2024       Recent Labs     11/26/24  1138 11/26/24  1546 11/26/24  2152 11/27/24  0738   POCGLU 162* 183* 150* 128       Blood Sugar Average: Last 72 hrs:  (P) 181

## 2024-11-27 NOTE — PLAN OF CARE
Problem: SAFETY ADULT  Goal: Patient will remain free of falls  Description: INTERVENTIONS:  - Educate patient/family on patient safety including physical limitations  - Instruct patient to call for assistance with activity   - Consult OT/PT to assist with strengthening/mobility   - Keep Call bell within reach  - Keep bed low and locked with side rails adjusted as appropriate  - Keep care items and personal belongings within reach  - Initiate and maintain comfort rounds  - Make Fall Risk Sign visible to staff  - Offer Toileting every  Hours, in advance of need  - Initiate/Maintain alarm  - Obtain necessary fall risk management equipment  - Apply yellow socks and bracelet for high fall risk patients  - Consider moving patient to room near nurses station  Outcome: Progressing     Problem: Neurological Deficit  Goal: Neurological status is stable or improving  Description: Interventions:  - Monitor and assess patient's level of consciousness, motor function, sensory function, and level of assistance needed for ADLs.   - Monitor and report changes from baseline. Collaborate with interdisciplinary team to initiate plan and implement interventions as ordered.   - Provide and maintain a safe environment.  - Consider seizure precautions.  - Consider fall precautions.  - Consider aspiration precautions.  - Consider bleeding precautions.  Outcome: Progressing     Problem: Activity Intolerance/Impaired Mobility  Goal: Mobility/activity is maintained at optimum level for patient  Description: Interventions:  - Assess and monitor patient  barriers to mobility and need for assistive/adaptive devices.  - Assess patient's emotional response to limitations.  - Collaborate with interdisciplinary team and initiate plans and interventions as ordered.  - Encourage independent activity per ability.  - Maintain proper body alignment.  - Perform active/passive rom as tolerated/ordered.  - Plan activities to conserve energy.  - Turn  patient as appropriate  Outcome: Not Progressing     Problem: Communication Impairment  Goal: Ability to express needs and understand communication  Description: Assess patient's communication skills and ability to understand information.  Patient will demonstrate use of effective communication techniques, alternative methods of communication and understanding even if not able to speak.     - Encourage communication and provide alternate methods of communication as needed.  - Collaborate with case management/ for discharge needs.  - Include patient/family/caregiver in decisions related to communication.  Outcome: Progressing     Problem: Potential for Aspiration  Goal: Non-ventilated patient's risk of aspiration is minimized  Description: Assess and monitor vital signs, respiratory status, and labs (WBC).  Monitor for signs of aspiration (tachypnea, cough, rales, wheezing, cyanosis, fever).    - Assess and monitor patient's ability to swallow.  - Place patient up in chair to eat if possible.  - HOB up at 90 degrees to eat if unable to get patient up into chair.  - Supervise patient during oral intake.   - Instruct patient/ family to take small bites.  - Instruct patient/ family to take small single sips when taking liquids.  - Follow patient-specific strategies generated by speech pathologist.  Outcome: Progressing  Goal: Ventilated patient's risk of aspiration is minimized  Description: Assess and monitor vital signs, respiratory status, airway cuff pressure, and labs (WBC).  Monitor for signs of aspiration (tachypnea, cough, rales, wheezing, cyanosis, fever).    - Elevate head of bed 30 degrees if patient has tube feeding.  - Monitor tube feeding.  Outcome: Progressing     Problem: Nutrition  Goal: Nutrition/Hydration status is improving  Description: Monitor and assess patient's nutrition/hydration status for malnutrition (ex- brittle hair, bruises, dry skin, pale skin and conjunctiva, muscle  wasting, smooth red tongue, and disorientation). Collaborate with interdisciplinary team and initiate plan and interventions as ordered.  Monitor patient's weight and dietary intake as ordered or per policy. Utilize nutrition screening tool and intervene per policy. Determine patient's food preferences and provide high-protein, high-caloric foods as appropriate.     - Assist patient with eating.  - Allow adequate time for meals.  - Encourage patient to take dietary supplement as ordered.  - Collaborate with clinical nutritionist.  - Include patient/family/caregiver in decisions related to nutrition.  Outcome: Progressing     Problem: NEUROSENSORY - ADULT  Goal: Achieves stable or improved neurological status  Description: INTERVENTIONS  - Monitor and report changes in neurological status  - Monitor vital signs such as temperature, blood pressure, glucose, and any other labs ordered   - Initiate measures to prevent increased intracranial pressure  - Monitor for seizure activity and implement precautions if appropriate      Outcome: Progressing  Goal: Remains free of injury related to seizures activity  Description: INTERVENTIONS  - Maintain airway, patient safety  and administer oxygen as ordered  - Monitor patient for seizure activity, document and report duration and description of seizure to physician/advanced practitioner  - If seizure occurs,  ensure patient safety during seizure  - Reorient patient post seizure  - Seizure pads on all 4 side rails  - Instruct patient/family to notify RN of any seizure activity including if an aura is experienced  - Instruct patient/family to call for assistance with activity based on nursing assessment  - Administer anti-seizure medications if ordered    Outcome: Progressing  Goal: Achieves maximal functionality and self care  Description: INTERVENTIONS  - Monitor swallowing and airway patency with patient fatigue and changes in neurological status  - Encourage and assist  patient to increase activity and self care.   - Encourage visually impaired, hearing impaired and aphasic patients to use assistive/communication devices  Outcome: Progressing     Problem: CARDIOVASCULAR - ADULT  Goal: Maintains optimal cardiac output and hemodynamic stability  Description: INTERVENTIONS:  - Monitor I/O, vital signs and rhythm  - Monitor for S/S and trends of decreased cardiac output  - Administer and titrate ordered vasoactive medications to optimize hemodynamic stability  - Assess quality of pulses, skin color and temperature  - Assess for signs of decreased coronary artery perfusion  - Instruct patient to report change in severity of symptoms  Outcome: Progressing  Goal: Absence of cardiac dysrhythmias or at baseline rhythm  Description: INTERVENTIONS:  - Continuous cardiac monitoring, vital signs, obtain 12 lead EKG if ordered  - Administer antiarrhythmic and heart rate control medications as ordered  - Monitor electrolytes and administer replacement therapy as ordered  Outcome: Progressing     Problem: MUSCULOSKELETAL - ADULT  Goal: Maintain or return mobility to safest level of function  Description: INTERVENTIONS:  - Assess patient's ability to carry out ADLs; assess patient's baseline for ADL function and identify physical deficits which impact ability to perform ADLs (bathing, care of mouth/teeth, toileting, grooming, dressing, etc.)  - Assess/evaluate cause of self-care deficits   - Assess range of motion  - Assess patient's mobility  - Assess patient's need for assistive devices and provide as appropriate  - Encourage maximum independence but intervene and supervise when necessary  - Involve family in performance of ADLs  - Assess for home care needs following discharge   - Consider OT consult to assist with ADL evaluation and planning for discharge  - Provide patient education as appropriate  Outcome: Not Progressing

## 2024-11-27 NOTE — ASSESSMENT & PLAN NOTE
Patient is a 50yo presenting from home who presented as stroke alert this a.m. and LKW was on sunday. Initial presenting deficits were: Ambulatory dysfunction due to decreased coordination of his left greater than right leg.  CURRENT SXS patient is still having gait imbalance due to decreased coordination, no headaches, dizziness, lightheadedness or weakness.  No focal deficits noted    EKG ED: Normal sinus rhythm, pulse 80s  CTA head and neck: No LVO, age-indeterminate lacunar infarct in the left mary beth, no stenosis of cervical carotid arteries, small left vertebral artery with proximal occlusion of the left V4 segment that could be chronic.    NIHSS 0  Lab Results   Component Value Date    HGBA1C 8.3 (H) 04/02/2024      Lab Results   Component Value Date    SVC5URWVFGFL 1.909 11/26/2024      Lab Results   Component Value Date    CHOLESTEROL 198 11/27/2024    TRIG 142 11/27/2024    HDL 38 (L) 11/27/2024    LDLCALC 132 (H) 11/27/2024         Plan:  Neuro following  May aim for normotension, as below  Repeat CTH if GCS drops 2pts in 1hr  Follow-up MRI brain w/o contrast, A1c  Atorvastatin 80 mg daily, aspirin 81 mg daily  Eliquis 5 mg twice daily  - Outpatient neurological follow up

## 2024-11-27 NOTE — CASE MANAGEMENT
Case Management Discharge Planning Note    Patient name Ralph Flowers  Location S /S -01 MRN [id number]  : 1975 Date 2024       Current Admission Date: 2024  Current Admission Diagnosis:Stroke-like symptoms   Patient Active Problem List    Diagnosis Date Noted Date Diagnosed    Stroke-like symptoms 2024     Hypertensive urgency 2024     History of noncompliance with medical treatment 2021     Morbid (severe) obesity due to excess calories (HCC) 2021     Obstructive sleep apnea 2021     Type 2 diabetes mellitus with hyperglycemia (HCC) 2021     Hyperlipidemia 2018     Hypothyroidism 05/10/2017     Paroxysmal atrial fibrillation (HCC) 2011     Essential hypertension 2011       LOS (days): 0  Geometric Mean LOS (GMLOS) (days):   Days to GMLOS:     OBJECTIVE:            Current admission status: Observation   Preferred Pharmacy:   [id number] - [place], [place] - [address]  [address] [place] [address]  Phone: [phone] Fax: [phone]    [id number] - [place] - [address]  [address]  [place] [address]  Phone: [phone] Fax: [phone]    Primary Care Provider: Xiomara Stinson DO    Primary Insurance: [employer]  Secondary Insurance:     DISCHARGE DETAILS:    Discharge planning discussed with:: patient  Freedom of Choice: Yes  Comments - Freedom of Choice: CM f/u with patient re: PT rcmd for acute rehab vs level 3 and need for RW. Patient declined STR and HHC opting for home w/ OPPT. CM provided patient w/ OPPT locations list for his reference. Pt relayed choice for [employer] DME provider for RW need. RW order placed to [employer] via parachute - currently in processing. CM to continue to follow for any further CM d/c needs identified.  CM contacted family/caregiver?: No- see comments (patient declined call to family)  Were Treatment Team discharge  recommendations reviewed with patient/caregiver?: Yes  Did patient/caregiver verbalize understanding of patient care needs?: Yes  Were patient/caregiver advised of the risks associated with not following Treatment Team discharge recommendations?: Yes         Requested Home Health Care         Is the patient interested in HHC at discharge?: No    DME Referral Provided  Referral made for DME?: Yes  DME referral completed for the following items:: [facility]  DME Supplier Name:: [employer]    Other Referral/Resources/Interventions Provided:  Interventions: Short Term Rehab, Outpatient PT  Referral Comments: PT rcmd level 1 vs level 3 pending medical optimization. Pt declined both STR and HHC opting for home with OPPT. RW ordered thru [employer] via parachute - currently in processing.         Treatment Team Recommendation: Short Term Rehab, Home with Home Health Care  Discharge Destination Plan:: Home  Transport at Discharge : Family

## 2024-11-27 NOTE — PLAN OF CARE
Problem: SAFETY ADULT  Goal: Patient will remain free of falls  Description: INTERVENTIONS:  - Educate patient/family on patient safety including physical limitations  - Instruct patient to call for assistance with activity   - Consult OT/PT to assist with strengthening/mobility   - Keep Call bell within reach  - Keep bed low and locked with side rails adjusted as appropriate  - Keep care items and personal belongings within reach  - Initiate and maintain comfort rounds  - Make Fall Risk Sign visible to staff  - Offer Toileting every 2 Hours, in advance of need  - Initiate/Maintain alarm  - Obtain necessary fall risk management equipment:   - Apply yellow socks and bracelet for high fall risk patients  - Consider moving patient to room near nurses station  Outcome: Progressing     Problem: Neurological Deficit  Goal: Neurological status is stable or improving  Description: Interventions:  - Monitor and assess patient's level of consciousness, motor function, sensory function, and level of assistance needed for ADLs.   - Monitor and report changes from baseline. Collaborate with interdisciplinary team to initiate plan and implement interventions as ordered.   - Provide and maintain a safe environment.  - Consider seizure precautions.  - Consider fall precautions.  - Consider aspiration precautions.  - Consider bleeding precautions.  Outcome: Progressing     Problem: Activity Intolerance/Impaired Mobility  Goal: Mobility/activity is maintained at optimum level for patient  Description: Interventions:  - Assess and monitor patient  barriers to mobility and need for assistive/adaptive devices.  - Assess patient's emotional response to limitations.  - Collaborate with interdisciplinary team and initiate plans and interventions as ordered.  - Encourage independent activity per ability.  - Maintain proper body alignment.  - Perform active/passive rom as tolerated/ordered.  - Plan activities to conserve energy.  - Turn  patient as appropriate  Outcome: Progressing     Problem: Communication Impairment  Goal: Ability to express needs and understand communication  Description: Assess patient's communication skills and ability to understand information.  Patient will demonstrate use of effective communication techniques, alternative methods of communication and understanding even if not able to speak.     - Encourage communication and provide alternate methods of communication as needed.  - Collaborate with case management/ for discharge needs.  - Include patient/family/caregiver in decisions related to communication.  Outcome: Progressing     Problem: Potential for Aspiration  Goal: Non-ventilated patient's risk of aspiration is minimized  Description: Assess and monitor vital signs, respiratory status, and labs (WBC).  Monitor for signs of aspiration (tachypnea, cough, rales, wheezing, cyanosis, fever).    - Assess and monitor patient's ability to swallow.  - Place patient up in chair to eat if possible.  - HOB up at 90 degrees to eat if unable to get patient up into chair.  - Supervise patient during oral intake.   - Instruct patient/ family to take small bites.  - Instruct patient/ family to take small single sips when taking liquids.  - Follow patient-specific strategies generated by speech pathologist.  Outcome: Progressing  Goal: Ventilated patient's risk of aspiration is minimized  Description: Assess and monitor vital signs, respiratory status, airway cuff pressure, and labs (WBC).  Monitor for signs of aspiration (tachypnea, cough, rales, wheezing, cyanosis, fever).    - Elevate head of bed 30 degrees if patient has tube feeding.  - Monitor tube feeding.  Outcome: Progressing     Problem: Nutrition  Goal: Nutrition/Hydration status is improving  Description: Monitor and assess patient's nutrition/hydration status for malnutrition (ex- brittle hair, bruises, dry skin, pale skin and conjunctiva, muscle wasting,  smooth red tongue, and disorientation). Collaborate with interdisciplinary team and initiate plan and interventions as ordered.  Monitor patient's weight and dietary intake as ordered or per policy. Utilize nutrition screening tool and intervene per policy. Determine patient's food preferences and provide high-protein, high-caloric foods as appropriate.     - Assist patient with eating.  - Allow adequate time for meals.  - Encourage patient to take dietary supplement as ordered.  - Collaborate with clinical nutritionist.  - Include patient/family/caregiver in decisions related to nutrition.  Outcome: Progressing     Problem: NEUROSENSORY - ADULT  Goal: Achieves stable or improved neurological status  Description: INTERVENTIONS  - Monitor and report changes in neurological status  - Monitor vital signs such as temperature, blood pressure, glucose, and any other labs ordered   - Initiate measures to prevent increased intracranial pressure  - Monitor for seizure activity and implement precautions if appropriate      Outcome: Progressing  Goal: Remains free of injury related to seizures activity  Description: INTERVENTIONS  - Maintain airway, patient safety  and administer oxygen as ordered  - Monitor patient for seizure activity, document and report duration and description of seizure to physician/advanced practitioner  - If seizure occurs,  ensure patient safety during seizure  - Reorient patient post seizure  - Seizure pads on all 4 side rails  - Instruct patient/family to notify RN of any seizure activity including if an aura is experienced  - Instruct patient/family to call for assistance with activity based on nursing assessment  - Administer anti-seizure medications if ordered    Outcome: Progressing  Goal: Achieves maximal functionality and self care  Description: INTERVENTIONS  - Monitor swallowing and airway patency with patient fatigue and changes in neurological status  - Encourage and assist patient to  increase activity and self care.   - Encourage visually impaired, hearing impaired and aphasic patients to use assistive/communication devices  Outcome: Progressing     Problem: CARDIOVASCULAR - ADULT  Goal: Maintains optimal cardiac output and hemodynamic stability  Description: INTERVENTIONS:  - Monitor I/O, vital signs and rhythm  - Monitor for S/S and trends of decreased cardiac output  - Administer and titrate ordered vasoactive medications to optimize hemodynamic stability  - Assess quality of pulses, skin color and temperature  - Assess for signs of decreased coronary artery perfusion  - Instruct patient to report change in severity of symptoms  Outcome: Progressing  Goal: Absence of cardiac dysrhythmias or at baseline rhythm  Description: INTERVENTIONS:  - Continuous cardiac monitoring, vital signs, obtain 12 lead EKG if ordered  - Administer antiarrhythmic and heart rate control medications as ordered  - Monitor electrolytes and administer replacement therapy as ordered  Outcome: Progressing     Problem: MUSCULOSKELETAL - ADULT  Goal: Maintain or return mobility to safest level of function  Description: INTERVENTIONS:  - Assess patient's ability to carry out ADLs; assess patient's baseline for ADL function and identify physical deficits which impact ability to perform ADLs (bathing, care of mouth/teeth, toileting, grooming, dressing, etc.)  - Assess/evaluate cause of self-care deficits   - Assess range of motion  - Assess patient's mobility  - Assess patient's need for assistive devices and provide as appropriate  - Encourage maximum independence but intervene and supervise when necessary  - Involve family in performance of ADLs  - Assess for home care needs following discharge   - Consider OT consult to assist with ADL evaluation and planning for discharge  - Provide patient education as appropriate  Outcome: Progressing

## 2024-11-27 NOTE — ASSESSMENT & PLAN NOTE
Blood Pressure: (!) 195/103 patient hypertensive to 220s on arrival, patient has been noncompliant with antihypertensive regimen including daily amlodipine 10 mg, diltiazem 240 mg, lisinopril 40 mg (obtained via chart review).  Per neuro, okay to aim for normotension., however will pursue 25% or less reduction of BP since patient has likely been chronically hypertensive for months.  Holding amlodipine and reducing dose of diltiazem at this time.    Plan:  Continue lisinopril 40 mg  Start diltiazem 120 mg  Start HCTZ 25 mg  Hold amlodipine 10 mg

## 2024-11-27 NOTE — ASSESSMENT & PLAN NOTE
"Estimated body mass index is 42.47 kg/m² as calculated from the following:    Height as of this encounter: 5' 10\" (1.778 m).    Weight as of this encounter: 134 kg (296 lb).   Body mass index is 42.47 kg/m².    Recommend incorporating a more whole foods plant-predominant diet along with decreasing consumption of red meats and processed foods  Per AHA guidelines, recommend moderate-vigorous intensity exercise for 30 minutes a day for 5 days a week or a total of 150 min/week   "

## 2024-11-28 VITALS
DIASTOLIC BLOOD PRESSURE: 94 MMHG | WEIGHT: 296 LBS | HEART RATE: 79 BPM | RESPIRATION RATE: 20 BRPM | OXYGEN SATURATION: 96 % | HEIGHT: 70 IN | SYSTOLIC BLOOD PRESSURE: 156 MMHG | BODY MASS INDEX: 42.37 KG/M2 | TEMPERATURE: 98.1 F

## 2024-11-28 PROBLEM — I63.9 CEREBROVASCULAR ACCIDENT (CVA) DUE TO EMBOLISM (HCC): Status: ACTIVE | Noted: 2024-11-26

## 2024-11-28 LAB
ANION GAP SERPL CALCULATED.3IONS-SCNC: 10 MMOL/L (ref 4–13)
BASOPHILS # BLD AUTO: 0.04 THOUSANDS/ΜL (ref 0–0.1)
BASOPHILS NFR BLD AUTO: 0 % (ref 0–1)
BUN SERPL-MCNC: 15 MG/DL (ref 5–25)
CALCIUM SERPL-MCNC: 9.2 MG/DL (ref 8.4–10.2)
CHLORIDE SERPL-SCNC: 103 MMOL/L (ref 96–108)
CO2 SERPL-SCNC: 23 MMOL/L (ref 21–32)
CREAT SERPL-MCNC: 0.93 MG/DL (ref 0.6–1.3)
EOSINOPHIL # BLD AUTO: 0.11 THOUSAND/ΜL (ref 0–0.61)
EOSINOPHIL NFR BLD AUTO: 1 % (ref 0–6)
ERYTHROCYTE [DISTWIDTH] IN BLOOD BY AUTOMATED COUNT: 13.2 % (ref 11.6–15.1)
GFR SERPL CREATININE-BSD FRML MDRD: 96 ML/MIN/1.73SQ M
GLUCOSE P FAST SERPL-MCNC: 132 MG/DL (ref 65–99)
GLUCOSE SERPL-MCNC: 132 MG/DL (ref 65–140)
GLUCOSE SERPL-MCNC: 147 MG/DL (ref 65–140)
GLUCOSE SERPL-MCNC: 190 MG/DL (ref 65–140)
HCT VFR BLD AUTO: 45.6 % (ref 36.5–49.3)
HGB BLD-MCNC: 15.3 G/DL (ref 12–17)
IMM GRANULOCYTES # BLD AUTO: 0.04 THOUSAND/UL (ref 0–0.2)
IMM GRANULOCYTES NFR BLD AUTO: 0 % (ref 0–2)
LYMPHOCYTES # BLD AUTO: 2.93 THOUSANDS/ΜL (ref 0.6–4.47)
LYMPHOCYTES NFR BLD AUTO: 31 % (ref 14–44)
MCH RBC QN AUTO: 29 PG (ref 26.8–34.3)
MCHC RBC AUTO-ENTMCNC: 33.6 G/DL (ref 31.4–37.4)
MCV RBC AUTO: 86 FL (ref 82–98)
MONOCYTES # BLD AUTO: 0.81 THOUSAND/ΜL (ref 0.17–1.22)
MONOCYTES NFR BLD AUTO: 9 % (ref 4–12)
NEUTROPHILS # BLD AUTO: 5.61 THOUSANDS/ΜL (ref 1.85–7.62)
NEUTS SEG NFR BLD AUTO: 59 % (ref 43–75)
NRBC BLD AUTO-RTO: 0 /100 WBCS
PLATELET # BLD AUTO: 238 THOUSANDS/UL (ref 149–390)
PMV BLD AUTO: 10.5 FL (ref 8.9–12.7)
POTASSIUM SERPL-SCNC: 3.6 MMOL/L (ref 3.5–5.3)
RBC # BLD AUTO: 5.28 MILLION/UL (ref 3.88–5.62)
SODIUM SERPL-SCNC: 136 MMOL/L (ref 135–147)
WBC # BLD AUTO: 9.54 THOUSAND/UL (ref 4.31–10.16)

## 2024-11-28 PROCEDURE — 80048 BASIC METABOLIC PNL TOTAL CA: CPT

## 2024-11-28 PROCEDURE — 99239 HOSP IP/OBS DSCHRG MGMT >30: CPT | Performed by: INTERNAL MEDICINE

## 2024-11-28 PROCEDURE — 85025 COMPLETE CBC W/AUTO DIFF WBC: CPT

## 2024-11-28 PROCEDURE — 82948 REAGENT STRIP/BLOOD GLUCOSE: CPT

## 2024-11-28 RX ORDER — HYDROCHLOROTHIAZIDE 12.5 MG/1
1 CAPSULE ORAL
Qty: 4 EACH | Refills: 0 | Status: SHIPPED | OUTPATIENT
Start: 2024-11-28

## 2024-11-28 RX ORDER — LISINOPRIL 40 MG/1
40 TABLET ORAL DAILY
Qty: 30 TABLET | Refills: 0 | Status: SHIPPED | OUTPATIENT
Start: 2024-11-28 | End: 2024-12-28

## 2024-11-28 RX ORDER — POLYETHYLENE GLYCOL 3350 17 G/17G
17 POWDER, FOR SOLUTION ORAL DAILY
Qty: 510 G | Refills: 0 | Status: CANCELLED | OUTPATIENT
Start: 2024-11-28

## 2024-11-28 RX ORDER — ASPIRIN 81 MG/1
81 TABLET ORAL DAILY
Qty: 30 TABLET | Refills: 0 | Status: SHIPPED | OUTPATIENT
Start: 2024-11-28 | End: 2024-12-28

## 2024-11-28 RX ORDER — DILTIAZEM HYDROCHLORIDE 120 MG/1
120 CAPSULE, COATED, EXTENDED RELEASE ORAL DAILY
Qty: 30 CAPSULE | Refills: 0 | Status: SHIPPED | OUTPATIENT
Start: 2024-11-28 | End: 2024-12-28

## 2024-11-28 RX ORDER — KETOROLAC TROMETHAMINE 30 MG/ML
1 INJECTION, SOLUTION INTRAMUSCULAR; INTRAVENOUS DAILY
Qty: 1 EACH | Refills: 0 | Status: SHIPPED | OUTPATIENT
Start: 2024-11-28 | End: 2024-12-28

## 2024-11-28 RX ORDER — AMLODIPINE BESYLATE 5 MG/1
5 TABLET ORAL DAILY
Qty: 30 TABLET | Refills: 0 | Status: SHIPPED | OUTPATIENT
Start: 2024-11-28 | End: 2024-12-28

## 2024-11-28 RX ORDER — AMLODIPINE BESYLATE 5 MG/1
5 TABLET ORAL DAILY
Status: DISCONTINUED | OUTPATIENT
Start: 2024-11-28 | End: 2024-11-28 | Stop reason: HOSPADM

## 2024-11-28 RX ORDER — LEVOTHYROXINE SODIUM 50 UG/1
50 TABLET ORAL DAILY
Qty: 30 TABLET | Refills: 0 | Status: CANCELLED | OUTPATIENT
Start: 2024-11-28

## 2024-11-28 RX ORDER — HYDROCHLOROTHIAZIDE 25 MG/1
25 TABLET ORAL DAILY
Qty: 30 TABLET | Refills: 0 | Status: SHIPPED | OUTPATIENT
Start: 2024-11-29 | End: 2024-12-29

## 2024-11-28 RX ORDER — GLUCOSAMINE HCL/CHONDROITIN SU 500-400 MG
CAPSULE ORAL
Qty: 100 EACH | Refills: 0 | Status: SHIPPED | OUTPATIENT
Start: 2024-11-28

## 2024-11-28 RX ORDER — BLOOD-GLUCOSE METER
KIT MISCELLANEOUS
Qty: 1 KIT | Refills: 0 | Status: SHIPPED | OUTPATIENT
Start: 2024-11-28

## 2024-11-28 RX ORDER — POLYETHYLENE GLYCOL 3350 17 G/17G
17 POWDER, FOR SOLUTION ORAL DAILY
Qty: 510 G | Refills: 0 | Status: SHIPPED | OUTPATIENT
Start: 2024-11-29

## 2024-11-28 RX ORDER — LANCETS 33 GAUGE
EACH MISCELLANEOUS
Qty: 100 EACH | Refills: 0 | Status: SHIPPED | OUTPATIENT
Start: 2024-11-28

## 2024-11-28 RX ORDER — ATORVASTATIN CALCIUM 80 MG/1
80 TABLET, FILM COATED ORAL DAILY
Qty: 30 TABLET | Refills: 0 | Status: SHIPPED | OUTPATIENT
Start: 2024-11-28 | End: 2024-12-28

## 2024-11-28 RX ORDER — HYDROCHLOROTHIAZIDE 25 MG/1
25 TABLET ORAL DAILY
Qty: 30 TABLET | Refills: 0 | Status: CANCELLED | OUTPATIENT
Start: 2024-11-28

## 2024-11-28 RX ORDER — ACYCLOVIR 800 MG/1
1 TABLET ORAL
Qty: 4 EACH | Refills: 0 | Status: SHIPPED | OUTPATIENT
Start: 2024-11-28 | End: 2024-12-28

## 2024-11-28 RX ORDER — LEVOTHYROXINE SODIUM 50 UG/1
50 TABLET ORAL DAILY
Qty: 30 TABLET | Refills: 0 | Status: SHIPPED | OUTPATIENT
Start: 2024-11-28 | End: 2024-12-28

## 2024-11-28 RX ADMIN — APIXABAN 5 MG: 5 TABLET, FILM COATED ORAL at 08:52

## 2024-11-28 RX ADMIN — HYDROCHLOROTHIAZIDE 25 MG: 25 TABLET ORAL at 08:52

## 2024-11-28 RX ADMIN — DILTIAZEM HYDROCHLORIDE 120 MG: 120 CAPSULE, COATED, EXTENDED RELEASE ORAL at 08:52

## 2024-11-28 RX ADMIN — LISINOPRIL 40 MG: 10 TABLET ORAL at 08:52

## 2024-11-28 RX ADMIN — ASPIRIN 81 MG: 81 TABLET, COATED ORAL at 08:52

## 2024-11-28 RX ADMIN — LEVOTHYROXINE SODIUM 50 MCG: 50 TABLET ORAL at 11:42

## 2024-11-28 RX ADMIN — ATORVASTATIN CALCIUM 80 MG: 40 TABLET, FILM COATED ORAL at 08:52

## 2024-11-28 RX ADMIN — AMLODIPINE BESYLATE 5 MG: 5 TABLET ORAL at 11:42

## 2024-11-28 RX ADMIN — INSULIN LISPRO 2 UNITS: 100 INJECTION, SOLUTION INTRAVENOUS; SUBCUTANEOUS at 11:42

## 2024-11-28 NOTE — DISCHARGE SUMMARY
"Discharge Summary - Hospitalist   Name: Ralph Flowers 49 y.o. male I MRN: [id number]  Unit/Bed#: S -01 I Date of Admission: 11/26/2024   Date of Service: 11/28/2024 I Hospital Day: 0     Assessment & Plan  Cerebrovascular accident (CVA) due to embolism (HCC)  MRI was personally reviewed, which confirmed a L medullary and L frontal infarction though imaging overall degraded by motion. Pt has been started on apixaban.  Most likely etiology is embolic strokes secondary to underlying A-fib with anticoagulation noncompliance. Neurology was consulted and will follow him in the outpatient setting.     Plan:  Atorvastatin 80 mg daily, aspirin 81 mg daily  Eliquis 5 mg twice daily  Outpatient neurological follow up   Hypertensive urgency  Blood Pressure: 163/97   Patient hypertensive to 220s on arrival, patient has been noncompliant with antihypertensive regimen including daily amlodipine 10 mg, diltiazem 240 mg, lisinopril 40 mg (obtained via chart review).     Plan:  Continue lisinopril 40 mg  Start diltiazem 120 mg  Start HCTZ 25 mg  Resume amlodipine at lower dose of 5 mg daily  Paroxysmal atrial fibrillation (HCC)  Pulse: 79   Rate control: Diltiazem 120 mg daily    Recent Labs     11/26/24  0742   INR 1.03      Plan:  Eliquis 5 mg twice daily  Continue home meds  Monitor rates/BP   Type 2 diabetes mellitus with hyperglycemia (HCC)  Lab Results   Component Value Date    HGBA1C 12.9 (H) 11/27/2024    HGBA1C 12.7 (H) 11/27/2024       Recent Labs     11/27/24  1104 11/27/24  1546 11/27/24  2125 11/28/24  0742   POCGLU 182* 156* 169* 147*     Blood Sugar Average: Last 72 hrs: (P) 173.8210435161616127    Plan:  Start metformin 1000 mg BID.  Given scripts for FreeStyle 3 Fredis and glucometer.  Recommend he establish with new PCP.  Morbid (severe) obesity due to excess calories (HCC)  Estimated body mass index is 42.47 kg/m² as calculated from the following:    Height as of this encounter: 5' 10\" (1.778 m).    Weight " as of this encounter: 134 kg (296 lb).   Body mass index is 42.47 kg/m².    Recommend incorporating a more whole foods plant-predominant diet along with decreasing consumption of red meats and processed foods    Per AHA guidelines, recommend moderate-vigorous intensity exercise for 30 minutes a day for 5 days a week or a total of 150 min/week   History of noncompliance with medical treatment  Patient has not taken medications for 3 months, states difficulty with make it to his PCP appointments due to work restrictions.    Will try to set him up with new PCP for possible televisits.  Essential hypertension  See plan for hypertensive urgency  Hypothyroidism  Continue PTA levothyroxine, follow-up TSH as above  Obstructive sleep apnea  Patient may need referral for sleep study  Hyperlipidemia  Continue PTA atorvastatin     Medical Problems      Discharging Physician / Practitioner: Enrique Haider MD  PCP: Xiomara Stinson DO  Admission Date:   Admission Orders (From admission, onward)       Ordered        11/26/24 0847  Place in Observation  Once                          Discharge Date: 11/28/24    Consultations During Hospital Stay:  Neurology    Procedures Performed:   None    Significant Findings / Test Results:   -Small area of acute to subacute infarct within the left lateral medulla.     -Additional artifact versus acute to subacute infarct within the left posterior frontal lobe.     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     -T2/FLAIR hyperintensities within the periventricular and deep white matter, nonspecific however distribution may be suggestive of demyelinating disease in the correct clinical setting.     Incidental Findings:   No significant stenosis of the cervical carotid arteries.   Small left vertebral artery with proximal occlusion of the left V4 segment that could be chronic. Patent left PICA that arises from the distal V3 segment.   No other large vessel occlusion.   Multifocal  "intracranial stenosis as above.    I reviewed the above mentioned incidental findings with the patient and/or family and they expressed understanding.    Test Results Pending at Discharge (will require follow up):   None     Outpatient Tests Requested:  None    Complications:  None    Reason for Admission: Gait abnormality    Hospital Course:   Ralph Flowers is a 49 y.o. male patient with PMH of acute CVA, paroxysmal A-fib, uncontrolled hypertension and hyperlipidemia, morbid obesity, JAVON, type 2 diabetes overall medication noncompliance who originally presented to the hospital on 11/26/2024 due to gait abnormality.  Workup with MRI revealed left medullary and left frontal infarction.  This was thought to be due to in the setting of A-fib with with anticoagulation noncompliance.  We discussed and educated him on the importance of medication compliance.  We started him on antihypertensives and gave him a prescription for glucometer and continuous glucose monitoring.  We also started him on Eliquis 5 mg twice daily instead of warfarin 7.5 mg.  We recommended he follow-up with neurology and establish care with a PCP to better manage his chronic comorbidities.    Condition at Discharge: good    Discharge Day Visit / Exam:   Subjective:    Patient seen and examined at bedside today.  Reports feeling well with no obvious focal deficit appreciated.  There were no overnight events.  No new symptoms today.  We discussed the cost of Eliquis and glucometer as well as CGM.  He is willing to pair the upfront cost for these.    Vitals: Blood Pressure: 163/97 (11/28/24 0742)  Pulse: 79 (11/28/24 0742)  Temperature: 98.1 °F (36.7 °C) (11/28/24 0742)  Temp Source: Oral (11/28/24 0530)  Respirations: 20 (11/28/24 0530)  Height: 5' 10\" (177.8 cm) (11/26/24 1429)  Weight - Scale: 134 kg (296 lb) (11/26/24 1429)  SpO2: 96 % (11/28/24 0742)  Physical Exam  Vitals and nursing note reviewed.   Constitutional:       General: He is not " in acute distress.     Appearance: He is well-developed. He is obese.   HENT:      Head: Normocephalic and atraumatic.   Eyes:      Conjunctiva/sclera: Conjunctivae normal.   Cardiovascular:      Rate and Rhythm: Normal rate and regular rhythm.      Heart sounds: No murmur heard.  Pulmonary:      Effort: Pulmonary effort is normal. No respiratory distress.      Breath sounds: Normal breath sounds. No wheezing.   Abdominal:      General: Bowel sounds are normal.      Palpations: Abdomen is soft.      Tenderness: There is no abdominal tenderness.   Musculoskeletal:         General: No swelling.      Right lower leg: No edema.      Left lower leg: No edema.   Skin:     General: Skin is warm and dry.      Capillary Refill: Capillary refill takes less than 2 seconds.   Neurological:      General: No focal deficit present.      Mental Status: He is alert and oriented to person, place, and time. Mental status is at baseline.   Psychiatric:         Mood and Affect: Mood normal.          Discussion with Family: Patient declined call to .     Discharge instructions/Information to patient and family:   See after visit summary for information provided to patient and family.      Provisions for Follow-Up Care:  See after visit summary for information related to follow-up care and any pertinent home health orders.      Mobility at time of Discharge:   Basic Mobility Inpatient Raw Score: 16  JH-HLM Goal: 5: Stand one or more mins  JH-HLM Achieved: 5: Stand (1 or more minutes)  HLM Goal achieved. Continue to encourage appropriate mobility.     Disposition:   Home    Planned Readmission: No    Discharge Medications:  See after visit summary for reconciled discharge medications provided to patient and/or family.      Administrative Statements   Discharge Statement:  I have spent a total time of 30 minutes in caring for this patient on the day of the visit/encounter. .    **Please Note: This note may have been  constructed using a voice recognition system**

## 2024-11-28 NOTE — ASSESSMENT & PLAN NOTE
Blood Pressure: 163/97   Patient hypertensive to 220s on arrival, patient has been noncompliant with antihypertensive regimen including daily amlodipine 10 mg, diltiazem 240 mg, lisinopril 40 mg (obtained via chart review).     Plan:  Continue lisinopril 40 mg  Start diltiazem 120 mg  Start HCTZ 25 mg  Resume amlodipine at lower dose of 5 mg daily

## 2024-11-28 NOTE — PLAN OF CARE
Problem: SAFETY ADULT  Goal: Patient will remain free of falls  Description: INTERVENTIONS:  - Educate patient/family on patient safety including physical limitations  - Instruct patient to call for assistance with activity   - Consult OT/PT to assist with strengthening/mobility   - Keep Call bell within reach  - Keep bed low and locked with side rails adjusted as appropriate  - Keep care items and personal belongings within reach  - Initiate and maintain comfort rounds  - Make Fall Risk Sign visible to staff  - Offer Toileting every 2 Hours, in advance of need  - Initiate/Maintain Bed alarm  - Apply yellow socks and bracelet for high fall risk patients  - Consider moving patient to room near nurses station  Outcome: Progressing     Problem: Neurological Deficit  Goal: Neurological status is stable or improving  Description: Interventions:  - Monitor and assess patient's level of consciousness, motor function, sensory function, and level of assistance needed for ADLs.   - Monitor and report changes from baseline. Collaborate with interdisciplinary team to initiate plan and implement interventions as ordered.   - Provide and maintain a safe environment.  - Consider seizure precautions.  - Consider fall precautions.  - Consider aspiration precautions.  - Consider bleeding precautions.  Outcome: Progressing     Problem: Activity Intolerance/Impaired Mobility  Goal: Mobility/activity is maintained at optimum level for patient  Description: Interventions:  - Assess and monitor patient  barriers to mobility and need for assistive/adaptive devices.  - Assess patient's emotional response to limitations.  - Collaborate with interdisciplinary team and initiate plans and interventions as ordered.  - Encourage independent activity per ability.  - Maintain proper body alignment.  - Perform active/passive rom as tolerated/ordered.  - Plan activities to conserve energy.  - Turn patient as appropriate  Outcome: Progressing      Problem: Communication Impairment  Goal: Ability to express needs and understand communication  Description: Assess patient's communication skills and ability to understand information.  Patient will demonstrate use of effective communication techniques, alternative methods of communication and understanding even if not able to speak.     - Encourage communication and provide alternate methods of communication as needed.  - Collaborate with case management/ for discharge needs.  - Include patient/family/caregiver in decisions related to communication.  Outcome: Progressing     Problem: Potential for Aspiration  Goal: Non-ventilated patient's risk of aspiration is minimized  Description: Assess and monitor vital signs, respiratory status, and labs (WBC).  Monitor for signs of aspiration (tachypnea, cough, rales, wheezing, cyanosis, fever).    - Assess and monitor patient's ability to swallow.  - Place patient up in chair to eat if possible.  - HOB up at 90 degrees to eat if unable to get patient up into chair.  - Supervise patient during oral intake.   - Instruct patient/ family to take small bites.  - Instruct patient/ family to take small single sips when taking liquids.  - Follow patient-specific strategies generated by speech pathologist.  Outcome: Progressing  Goal: Ventilated patient's risk of aspiration is minimized  Description: Assess and monitor vital signs, respiratory status, airway cuff pressure, and labs (WBC).  Monitor for signs of aspiration (tachypnea, cough, rales, wheezing, cyanosis, fever).    - Elevate head of bed 30 degrees if patient has tube feeding.  - Monitor tube feeding.  Outcome: Progressing     Problem: Nutrition  Goal: Nutrition/Hydration status is improving  Description: Monitor and assess patient's nutrition/hydration status for malnutrition (ex- brittle hair, bruises, dry skin, pale skin and conjunctiva, muscle wasting, smooth red tongue, and disorientation).  Collaborate with interdisciplinary team and initiate plan and interventions as ordered.  Monitor patient's weight and dietary intake as ordered or per policy. Utilize nutrition screening tool and intervene per policy. Determine patient's food preferences and provide high-protein, high-caloric foods as appropriate.     - Assist patient with eating.  - Allow adequate time for meals.  - Encourage patient to take dietary supplement as ordered.  - Collaborate with clinical nutritionist.  - Include patient/family/caregiver in decisions related to nutrition.  Outcome: Progressing     Problem: NEUROSENSORY - ADULT  Goal: Achieves stable or improved neurological status  Description: INTERVENTIONS  - Monitor and report changes in neurological status  - Monitor vital signs such as temperature, blood pressure, glucose, and any other labs ordered   - Initiate measures to prevent increased intracranial pressure  - Monitor for seizure activity and implement precautions if appropriate      Outcome: Progressing  Goal: Remains free of injury related to seizures activity  Description: INTERVENTIONS  - Maintain airway, patient safety  and administer oxygen as ordered  - Monitor patient for seizure activity, document and report duration and description of seizure to physician/advanced practitioner  - If seizure occurs,  ensure patient safety during seizure  - Reorient patient post seizure  - Seizure pads on all 4 side rails  - Instruct patient/family to notify RN of any seizure activity including if an aura is experienced  - Instruct patient/family to call for assistance with activity based on nursing assessment  - Administer anti-seizure medications if ordered    Outcome: Progressing  Goal: Achieves maximal functionality and self care  Description: INTERVENTIONS  - Monitor swallowing and airway patency with patient fatigue and changes in neurological status  - Encourage and assist patient to increase activity and self care.   -  Encourage visually impaired, hearing impaired and aphasic patients to use assistive/communication devices  Outcome: Progressing     Problem: CARDIOVASCULAR - ADULT  Goal: Maintains optimal cardiac output and hemodynamic stability  Description: INTERVENTIONS:  - Monitor I/O, vital signs and rhythm  - Monitor for S/S and trends of decreased cardiac output  - Administer and titrate ordered vasoactive medications to optimize hemodynamic stability  - Assess quality of pulses, skin color and temperature  - Assess for signs of decreased coronary artery perfusion  - Instruct patient to report change in severity of symptoms  Outcome: Progressing  Goal: Absence of cardiac dysrhythmias or at baseline rhythm  Description: INTERVENTIONS:  - Continuous cardiac monitoring, vital signs, obtain 12 lead EKG if ordered  - Administer antiarrhythmic and heart rate control medications as ordered  - Monitor electrolytes and administer replacement therapy as ordered  Outcome: Progressing     Problem: MUSCULOSKELETAL - ADULT  Goal: Maintain or return mobility to safest level of function  Description: INTERVENTIONS:  - Assess patient's ability to carry out ADLs; assess patient's baseline for ADL function and identify physical deficits which impact ability to perform ADLs (bathing, care of mouth/teeth, toileting, grooming, dressing, etc.)  - Assess/evaluate cause of self-care deficits   - Assess range of motion  - Assess patient's mobility  - Assess patient's need for assistive devices and provide as appropriate  - Encourage maximum independence but intervene and supervise when necessary  - Involve family in performance of ADLs  - Assess for home care needs following discharge   - Consider OT consult to assist with ADL evaluation and planning for discharge  - Provide patient education as appropriate  Outcome: Progressing

## 2024-11-28 NOTE — UTILIZATION REVIEW
SEE INITIAL REVIEW AT BOTTOM  Continued Stay Review    Date: 11/28/2024                          Current Patient Class: Observation    Current Level of Care: ms    HPI:49 y.o. male initially admitted on 11/26 OBS w stroke like sympt: MRI confirms L medullary and L frontal infarction     Assessment/Plan:   11/27 IP CM : PT rcmd for acute rehab vs level 3 and need for RW. Patient declined STR and HHC opting for home w/ OPPT. CM provided patient w/ OPPT locations list for his reference. Pt relayed choice for [employer] DME provider for RW need. RW order placed to [employer] via parachute - currently in processing.     Medications:   Scheduled Medications:  apixaban, 5 mg, Oral, BID  aspirin, 81 mg, Oral, Daily  atorvastatin, 80 mg, Oral, Daily  diltiazem, 120 mg, Oral, Daily  hydroCHLOROthiazide, 25 mg, Oral, Daily  insulin lispro, 2-12 Units, Subcutaneous, TID AC  levothyroxine, 50 mcg, Oral, Daily  lisinopril, 40 mg, Oral, Daily  polyethylene glycol, 17 g, Oral, Daily      Continuous IV Infusions:     PRN Meds:  acetaminophen, 650 mg, Oral, Q6H PRN  hydrALAZINE, 10 mg, Intravenous, Q6H PRN      Discharge Plan: TBD    Vital Signs (last 3 days)       Date/Time Temp Pulse Resp BP MAP (mmHg) SpO2 Calculated FIO2 (%) - Nasal Cannula Nasal Cannula O2 Flow Rate (L/min) O2 Device Patient Position - Orthostatic VS Kait Coma Scale Score Pain    11/28/24 07:42:06 98.1 °F (36.7 °C) 79 -- 163/97 119 96 % -- -- -- -- -- --    11/28/24 05:45:40 97.8 °F (36.6 °C) 71 -- 154/99 117 91 % -- -- -- -- -- --    11/28/24 0530 97.8 °F (36.6 °C) -- 20 164/102 123 -- -- -- -- -- -- --    11/28/24 0330 -- -- -- -- -- -- -- -- -- -- 15 --    11/27/24 2330 -- -- -- -- -- -- -- -- None (Room air) -- 15 No Pain    11/27/24 21:23:59 98.2 °F (36.8 °C) 71 -- 156/106 123 94 % -- -- -- -- -- --    11/27/24 1930 -- -- -- -- -- -- -- -- -- -- 15 --    11/27/24 1530 -- -- -- -- -- -- -- -- -- -- 15 --    11/27/24 15:16:27 98.2 °F (36.8 °C) -- --  177/97 124 -- -- -- -- -- -- --    11/27/24 14:22:08 -- 65 -- 171/100 124 95 % -- -- -- -- -- --    11/27/24 12:02:59 -- 67 -- 184/103 130 97 % -- -- -- -- -- --    11/27/24 1130 -- -- -- -- -- -- -- -- -- -- 15 --    11/27/24 1004 -- -- -- -- -- -- -- -- -- -- 15 No Pain    11/27/24 0834 -- -- -- -- -- -- -- -- -- -- -- No Pain    11/27/24 0828 -- -- -- -- -- -- -- -- -- -- -- No Pain    11/27/24 07:36:30 98.2 °F (36.8 °C) 60 -- 195/103 134 96 % -- -- -- -- -- --    11/27/24 0730 -- -- -- -- -- -- -- -- -- -- 15 No Pain    11/27/24 0530 97.5 °F (36.4 °C) 50 20 166/95 112 -- -- -- -- Lying -- --    11/27/24 0330 97.7 °F (36.5 °C) 55 20 168/91 117 97 % -- -- -- Lying -- --    11/27/24 0130 97.6 °F (36.4 °C) 48 20 166/80 109 97 % -- -- None (Room air) Lying -- --    11/26/24 23:32:22 97.8 °F (36.6 °C) 53 17 168/81 110 97 % -- -- -- -- -- --    11/26/24 2215 -- -- -- 162/102 -- -- -- -- -- Lying -- --    11/26/24 21:42:56 -- 56 -- 178/97 124 96 % -- -- -- -- -- --    11/26/24 2130 -- -- 19 178/97 124 -- -- -- -- Lying -- --    11/26/24 2030 98.5 °F (36.9 °C) -- 18 177/95 122 -- -- -- -- Lying -- --    11/26/24 1930 97.4 °F (36.3 °C) 60 18 176/87 116 -- -- -- None (Room air) Lying 15 No Pain    11/26/24 18:30:13 -- -- -- 151/97 115 -- -- -- -- -- -- --    11/26/24 1824 -- -- -- -- -- -- -- -- None (Room air) -- 15 No Pain    11/26/24 18:04:41 97.8 °F (36.6 °C) -- -- 163/94 117 -- -- -- -- -- -- --    11/26/24 1700 -- 59 20 171/82 118 98 % 28 2 L/min Nasal cannula Lying -- --    11/26/24 1600 -- 57 19 167/84 119 97 % 28 2 L/min Nasal cannula Lying -- --    11/26/24 1557 -- -- -- -- -- 99 % 28 2 L/min Nasal cannula -- -- --    11/26/24 1556 -- -- -- -- -- 82 % -- -- None (Room air) -- -- --    11/26/24 1500 -- 60 20 157/69 99 98 % -- -- None (Room air) Lying 15 --    11/26/24 1429 -- 66 -- 119/58 -- -- -- -- -- -- -- --    11/26/24 1414 -- -- -- -- -- -- -- -- -- -- -- No Pain    11/26/24 1400 -- 66 28 119/58 84 97 % --  -- None (Room air) Lying -- --    11/26/24 1315 -- 80 22 158/88 117 96 % -- -- None (Room air) Lying -- --    11/26/24 1245 -- 65 31 172/77 111 97 % -- -- None (Room air) Lying -- --    11/26/24 1145 -- -- -- 176/97 127 -- -- -- -- Lying -- --    11/26/24 1121 -- -- -- -- -- -- -- -- -- -- 15 --    11/26/24 1119 -- -- -- 180/99 -- -- -- -- -- Standing - Orthostatic VS -- --    11/26/24 1117 -- -- -- 176/97 -- -- -- -- -- Sitting - Orthostatic VS -- --    11/26/24 1115 -- 74 25 171/94 123 97 % -- -- None (Room air) Lying -- --    11/26/24 1100 98 °F (36.7 °C) 67 25 171/94 126 98 % -- -- None (Room air) Lying 15 No Pain    11/26/24 0931 -- 63 17 215/98 140 96 % -- -- -- -- -- --    11/26/24 0900 -- 78 18 199/98 139 96 % -- -- -- -- -- --    11/26/24 0831 -- 68 20 192/79 114 97 % -- -- -- -- -- --    11/26/24 0815 -- 80 18 182/90 128 97 % -- -- -- -- -- --    11/26/24 0800 -- 75 19 187/88 127 99 % -- -- -- -- -- --    11/26/24 0753 -- -- -- -- -- -- -- -- None (Room air) -- -- --    11/26/24 0750 98 °F (36.7 °C) 79 22 175/82 -- 98 % -- -- None (Room air) Lying -- No Pain    11/26/24 0746 -- -- -- -- -- -- -- -- -- -- 15 --    11/26/24 0743 -- 83 22 212/97 139 98 % -- -- -- Lying -- No Pain          Weight (last 2 days)       Date/Time Weight    11/26/24 1429 134 (296)    11/26/24 1100 135 (296.74)    11/26/24 0749 135 (296.74)            Pertinent Labs/Diagnostic Results:   Radiology:  MRI brain wo contrast   Final Interpretation by Eulogio Reynaga MD (11/27 0924)   Motion-degraded examination including the DWI sequence limiting evaluation.      -Small area of acute to subacute infarct within the left lateral medulla.      -Additional artifact versus acute to subacute infarct within the left posterior frontal lobe.      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      -T2/FLAIR hyperintensities within the periventricular and deep white matter, nonspecific however distribution may be suggestive of  demyelinating disease in the correct clinical setting. Differential includes precocious microangiopathic changes,    infectious/inflammatory process among other etiologies.      I personally communicated the findings via telephone with Pipe Carnes at 9:23 a.m. on 11/27/2024      Workstation performed: [id number]         CTA stroke alert (head/neck)   Final Interpretation by E. Alec Schoenberger, MD (11/26 6750)      No significant stenosis of the cervical carotid arteries.   Small left vertebral artery with proximal occlusion of the left V4 segment that could be chronic. Patent left PICA that arises from the distal V3 segment.   No other large vessel occlusion.   Multifocal intracranial stenosis as above.      Findings were reported to Chuy Osborne  via HIPAA compliant secure electronic messaging at 7:49 a.m.         Workstation performed: [id number]         CT stroke alert brain   Final Interpretation by E. Alec Schoenberger, MD (11/26 0801)      No acute large vessel distribution infarct, hemorrhage, mass effect, shift or herniation. Age indeterminate lacunar infarct in the left mary beth that may correspond with finding described on outside MRI.   White matter changes likely due to chronic microangiopathy.         Findings were reported to Chuy Osborne  via HIPAA compliant secure electronic messaging at 7:49 a.m.         Workstation performed: [id number]           Cardiology:  No orders to display     GI:  No orders to display           Results from last 7 days   Lab Units 11/28/24 0538 11/27/24  0432 11/26/24  0742   WBC Thousand/uL 9.54 8.89 7.62   HEMOGLOBIN g/dL 15.3 14.2 14.6   HEMATOCRIT % 45.6 43.4 44.1   PLATELETS Thousands/uL 238 222 215   TOTAL NEUT ABS Thousands/µL 5.61  --   --          Results from last 7 days   Lab Units 11/28/24  0538 11/27/24  0432 11/26/24  0742   SODIUM mmol/L 136 139 135   POTASSIUM mmol/L 3.6 4.5 3.8   CHLORIDE mmol/L 103 107 104   CO2 mmol/L 23 25 23   ANION GAP mmol/L 10 7 8  "  BUN mg/dL 15 16 21   CREATININE mg/dL 0.93 1.03 1.17   EGFR ml/min/1.73sq m 96 85 73   CALCIUM mg/dL 9.2 9.1 9.0   MAGNESIUM mg/dL  --  1.9  --          Results from last 7 days   Lab Units 11/28/24  0742 11/27/24  2125 11/27/24  1546 11/27/24  1104 11/27/24  0738 11/26/24  2152 11/26/24  1546 11/26/24  1138 11/26/24  0738   POC GLUCOSE mg/dl 147* 169* 156* 182* 128 150* 183* 162* 282*     Results from last 7 days   Lab Units 11/28/24  0538 11/27/24  0432 11/26/24  0742   GLUCOSE RANDOM mg/dL 132 121 274*         Results from last 7 days   Lab Units 11/27/24  0432   HEMOGLOBIN A1C % 12.9*  12.7*   EAG mg/dl 324  318     No results found for: \"BETA-HYDROXYBUTYRATE\"                   Results from last 7 days   Lab Units 11/26/24  1142 11/26/24  0952 11/26/24  0742   HS TNI 0HR ng/L  --   --  8   HS TNI 2HR ng/L  --  9  --    HSTNI D2 ng/L  --  1  --    HS TNI 4HR ng/L 9  --   --    HSTNI D4 ng/L 1  --   --          Results from last 7 days   Lab Units 11/26/24  0742   PROTIME seconds 14.2   INR  1.03   PTT seconds 29     Results from last 7 days   Lab Units 11/26/24  0952   TSH 3RD GENERATON uIU/mL 1.909                                                                                                           Network Utilization Review Department  ATTENTION: Please call with any questions or concerns to [phone] and carefully listen to the prompts so that you are directed to the right person. All voicemails are confidential.   For Discharge needs, contact Care Management DC Support Team at [phone] opt. [phone]  Send all requests for admission clinical reviews, approved or denied determinations and any other requests to dedicated fax number below belonging to the campus where the patient is receiving treatment. List of dedicated fax numbers for the Facilities:  FACILITY NAME UR FAX NUMBER   ADMISSION DENIALS (Administrative/Medical Necessity) [phone]   DISCHARGE SUPPORT TEAM (NETWORK) [phone]   PARENT " CHILD HEALTH (Maternity/NICU/Pediatrics) [phone]   [facility] [phone]   [facility] [phone]   [facility] [phone]   [facility] [phone]   [facility] [phone]   [facility] [phone]   [facility] [phone]   [facility] [phone]   [facility] [phone]   [facility] [phone]   [facility] [phone]   [facility] [phone]

## 2024-11-28 NOTE — ASSESSMENT & PLAN NOTE
Medicare Wellness Visit  Plan for Preventive Care    A good way for you to stay healthy is to use preventive care.  Medicare covers many services that can help you stay healthy.* The goal of these services is to find any health problems as quickly as possible. Finding problems early can help make them easier to treat.  Your personal plan below lists the services you may need and when they are due.     Health Maintenance Summary     Topic Due On Due Status Completed On    Medicare Wellness Visit May 5, 2018 Overdue May 5, 2017    IMMUNIZATION - DTaP/Tdap/Td Nov 4, 2025 Not Due Nov 4, 2015    Immunization-Influenza Sep 1, 2018 Not Due     Depression Screening Jul 8, 2019 Not Due Jul 8, 2018      Completed May 5, 2017           Preventive Care for Women and Men    Heart Screenings (Cardiovascular):  · Blood tests are used to check your cholesterol, lipid and triglyceride levels. High levels can increase your risk for heart disease and stroke. High levels can be treated with medications, diet and exercise. Lowering your levels can help keep your heart and blood vessels healthy.  Your provider will order these tests if they are needed.    · An ultrasound is done to see if you have an abdominal aortic aneurysm (AAA).  This is an enlargement of one of the main blood vessels that delivers blood to the body.   In the [place], 9,000 deaths are caused by AAA.  You may not even know you have this problem and as many as 1 in 3 people will have a serious problem if it is not treated.  Early diagnosis allows for more effective treatment and cure.  If you have a family history of AAA or are a male age 65-75 who has smoked, you are at higher risk of an AAA.  Your provider can order this test, if needed.    Colorectal Screening:  · There are many tests that are used to check for cancer of your colon and rectum. You and your provider should discuss what test is best for you and when to have it done.  Options  Pulse: 79   Rate control: Diltiazem 120 mg daily    Recent Labs     11/26/24  0742   INR 1.03      Plan:  Eliquis 5 mg twice daily  Continue home meds  Monitor rates/BP    include:  · Screening Colonoscopy: exam of the entire colon, seen through a flexible lighted tube.  · Flexible Sigmoidoscopy: exam of the last third (sigmoid portion) of the colon and rectum, seen through a flexible lighted tube.  · Cologuard DNA stool test: a sample of your stool is used to screen for cancer and unseen blood in your stool.  · Fecal Occult Blood Test: a sample of your stool is studied to find any unseen blood    Flu Shot:  · An immunization that helps to prevent influenza (the flu). You should get this every year. The best time to get the shot is in the fall.    Pneumococcal Shot:  • Vaccines are available that can help prevent pneumococcal disease, which is any type of infection caused by Streptococcus pneumoniae bacteria.   Their use can prevent some cases of pneumonia, meningitis, and sepsis. There are two types of pneumococcal vaccines:   o Conjugate vaccines (PCV-13 or Prevnar 13®) - helps protect against the 13 types of pneumococcal bacteria that are the most common causes of serious infections in children and adults.    o Polysaccharide vaccine (PPSV23 or Kibszvjyz23®) - helps protect against 23 types of pneumococcal bacteria for patients who are recommended to get it.  These vaccines should be given at least 12 months apart.  A booster is usually not needed.     Hepatitis B Shot:  · An immunization that helps to protect people from getting Hepatitis B. Hepatitis B is a virus that spreads through contact with infected blood or body fluids. Many people with the virus do not have symptoms.  The virus can lead to serious problems, such as liver disease. Some people are at higher risk than others. Your doctor will tell you if you need this shot.     Diabetes Screening:  · A test to measure sugar (glucose) in your blood is called a fasting blood sugar. Fasting means you cannot have food or drink for at least 8 hours before the test. This test can detect diabetes long before you may notice  symptoms.    Glaucoma Screening:  · Glaucoma screening is performed by your eye doctor. The test measures the fluid pressure inside your eyes to determine if you have glaucoma.     Hepatitis C Screening:  · A blood test to see if you have the hepatitis C virus.  Hepatitis C attacks the liver and is a major cause of chronic liver disease.  Medicare will cover a single screening for all adults born between 1945 & 1965, or high risk patients (people who have injected illegal drugs or people who have had blood transfusions).  High risk patients who continue to inject illegal drugs can be screened for Hepatitis C every year.    Smoking and Tobacco-Use Cessation Counseling:  · Tobacco is the single greatest cause of disease and early death in our country today. Medication and counseling together can increase a person’s chance of quitting for good.   · Medicare covers two quitting attempts per year, with four counseling sessions per attempt (eight sessions in a 12 month period)    Preventive Screening tests for Women    Screening Mammograms and Breast Exams:  · An x-ray of your breasts to check for breast cancer before you or your doctor may be able to feel it.  If breast cancer is found early it can usually be treated with success.    Pelvic Exams and Pap Tests:  · An exam to check for cervical and vaginal cancer. A Pap test is a lab test in which cells are taken from your cervix and sent to the lab to look for signs of cervical cancer. If cancer of the cervix is found early, chances for a cure are good. Testing can generally end at age 65, or if a woman has a hysterectomy for a benign condition. Your provider may recommend more frequent testing if certain abnormal results are found.    Bone Mass Measurements:  · A painless x-ray of your bone density to see if you are at risk for a broken bone. Bone density refers to the thickness of bones or how tightly the bone tissue is packed.    Preventive Screening tests for  Men    Prostate Screening:  · PSA - Prostate Cancer blood test.  Experts do not recommend routine screening of healthy men with no signs or symptoms of prostate disease.  However, men should not ignore urinary symptoms, and should discuss their family history with their doctor.    *Medicare pays for many preventive services to keep you healthy. For some of these services, you might have to pay a deductible, coinsurance, and / or copayment.  The amounts vary depending on the type of services you need and the kind of Medicare health plan you have.

## 2024-11-28 NOTE — ASSESSMENT & PLAN NOTE
Lab Results   Component Value Date    HGBA1C 12.9 (H) 11/27/2024    HGBA1C 12.7 (H) 11/27/2024       Recent Labs     11/27/24  1104 11/27/24  1546 11/27/24 2125 11/28/24  0742   POCGLU 182* 156* 169* 147*     Blood Sugar Average: Last 72 hrs: (P) [id number]    Plan:  Start metformin 1000 mg BID.  Given scripts for FreeStyle 3 Fredis and glucometer.  Recommend he establish with new PCP.

## 2024-11-28 NOTE — ASSESSMENT & PLAN NOTE
MRI was personally reviewed, which confirmed a L medullary and L frontal infarction though imaging overall degraded by motion. Pt has been started on apixaban.  Most likely etiology is embolic strokes secondary to underlying A-fib with anticoagulation noncompliance. Neurology was consulted and will follow him in the outpatient setting.     Plan:  Atorvastatin 80 mg daily, aspirin 81 mg daily  Eliquis 5 mg twice daily  Outpatient neurological follow up

## 2024-11-28 NOTE — INCIDENTAL FINDINGS
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 Differential includes precocious microangiopathic changes, , infectious/inflammatory process among other etiologies.    Follow up required: Yes  Follow up should be done within 2 week(s)    Please notify the following clinician to assist with the follow up:   Dr. Stinson    Incidental finding results were discussed with the Patient by Enrique Haider MD on 11/28/24.   They expressed understanding and all questions answered.

## 2024-11-28 NOTE — ASSESSMENT & PLAN NOTE
Patient has not taken medications for 3 months, states difficulty with make it to his PCP appointments due to work restrictions.    Will try to set him up with new PCP for possible televisits.

## 2024-11-28 NOTE — PLAN OF CARE
Problem: SAFETY ADULT  Goal: Patient will remain free of falls  Description: INTERVENTIONS:  - Educate patient/family on patient safety including physical limitations  - Instruct patient to call for assistance with activity   - Consult OT/PT to assist with strengthening/mobility   - Keep Call bell within reach  - Keep bed low and locked with side rails adjusted as appropriate  - Keep care items and personal belongings within reach  - Initiate and maintain comfort rounds  - Make Fall Risk Sign visible to staff  - Offer Toileting every 2 Hours, in advance of need  - Initiate/Maintain bed/chair alarm  - Obtain necessary fall risk management equipment  - Apply yellow socks and bracelet for high fall risk patients  - Consider moving patient to room near nurses station  Outcome: Progressing     Problem: Neurological Deficit  Goal: Neurological status is stable or improving  Description: Interventions:  - Monitor and assess patient's level of consciousness, motor function, sensory function, and level of assistance needed for ADLs.   - Monitor and report changes from baseline. Collaborate with interdisciplinary team to initiate plan and implement interventions as ordered.   - Provide and maintain a safe environment.  - Consider seizure precautions.  - Consider fall precautions.  - Consider aspiration precautions.  - Consider bleeding precautions.  Outcome: Progressing     Problem: Activity Intolerance/Impaired Mobility  Goal: Mobility/activity is maintained at optimum level for patient  Description: Interventions:  - Assess and monitor patient  barriers to mobility and need for assistive/adaptive devices.  - Assess patient's emotional response to limitations.  - Collaborate with interdisciplinary team and initiate plans and interventions as ordered.  - Encourage independent activity per ability.  - Maintain proper body alignment.  - Perform active/passive rom as tolerated/ordered.  - Plan activities to conserve energy.  -  Turn patient as appropriate  Outcome: Progressing     Problem: Communication Impairment  Goal: Ability to express needs and understand communication  Description: Assess patient's communication skills and ability to understand information.  Patient will demonstrate use of effective communication techniques, alternative methods of communication and understanding even if not able to speak.     - Encourage communication and provide alternate methods of communication as needed.  - Collaborate with case management/ for discharge needs.  - Include patient/family/caregiver in decisions related to communication.  Outcome: Progressing     Problem: Potential for Aspiration  Goal: Non-ventilated patient's risk of aspiration is minimized  Description: Assess and monitor vital signs, respiratory status, and labs (WBC).  Monitor for signs of aspiration (tachypnea, cough, rales, wheezing, cyanosis, fever).    - Assess and monitor patient's ability to swallow.  - Place patient up in chair to eat if possible.  - HOB up at 90 degrees to eat if unable to get patient up into chair.  - Supervise patient during oral intake.   - Instruct patient/ family to take small bites.  - Instruct patient/ family to take small single sips when taking liquids.  - Follow patient-specific strategies generated by speech pathologist.  Outcome: Progressing  Goal: Ventilated patient's risk of aspiration is minimized  Description: Assess and monitor vital signs, respiratory status, airway cuff pressure, and labs (WBC).  Monitor for signs of aspiration (tachypnea, cough, rales, wheezing, cyanosis, fever).    - Elevate head of bed 30 degrees if patient has tube feeding.  - Monitor tube feeding.  Outcome: Progressing     Problem: Nutrition  Goal: Nutrition/Hydration status is improving  Description: Monitor and assess patient's nutrition/hydration status for malnutrition (ex- brittle hair, bruises, dry skin, pale skin and conjunctiva, muscle  wasting, smooth red tongue, and disorientation). Collaborate with interdisciplinary team and initiate plan and interventions as ordered.  Monitor patient's weight and dietary intake as ordered or per policy. Utilize nutrition screening tool and intervene per policy. Determine patient's food preferences and provide high-protein, high-caloric foods as appropriate.     - Assist patient with eating.  - Allow adequate time for meals.  - Encourage patient to take dietary supplement as ordered.  - Collaborate with clinical nutritionist.  - Include patient/family/caregiver in decisions related to nutrition.  Outcome: Progressing     Problem: NEUROSENSORY - ADULT  Goal: Achieves stable or improved neurological status  Description: INTERVENTIONS  - Monitor and report changes in neurological status  - Monitor vital signs such as temperature, blood pressure, glucose, and any other labs ordered   - Initiate measures to prevent increased intracranial pressure  - Monitor for seizure activity and implement precautions if appropriate      Outcome: Progressing  Goal: Remains free of injury related to seizures activity  Description: INTERVENTIONS  - Maintain airway, patient safety  and administer oxygen as ordered  - Monitor patient for seizure activity, document and report duration and description of seizure to physician/advanced practitioner  - If seizure occurs,  ensure patient safety during seizure  - Reorient patient post seizure  - Seizure pads on all 4 side rails  - Instruct patient/family to notify RN of any seizure activity including if an aura is experienced  - Instruct patient/family to call for assistance with activity based on nursing assessment  - Administer anti-seizure medications if ordered    Outcome: Progressing  Goal: Achieves maximal functionality and self care  Description: INTERVENTIONS  - Monitor swallowing and airway patency with patient fatigue and changes in neurological status  - Encourage and assist  patient to increase activity and self care.   - Encourage visually impaired, hearing impaired and aphasic patients to use assistive/communication devices  Outcome: Progressing     Problem: CARDIOVASCULAR - ADULT  Goal: Maintains optimal cardiac output and hemodynamic stability  Description: INTERVENTIONS:  - Monitor I/O, vital signs and rhythm  - Monitor for S/S and trends of decreased cardiac output  - Administer and titrate ordered vasoactive medications to optimize hemodynamic stability  - Assess quality of pulses, skin color and temperature  - Assess for signs of decreased coronary artery perfusion  - Instruct patient to report change in severity of symptoms  Outcome: Progressing  Goal: Absence of cardiac dysrhythmias or at baseline rhythm  Description: INTERVENTIONS:  - Continuous cardiac monitoring, vital signs, obtain 12 lead EKG if ordered  - Administer antiarrhythmic and heart rate control medications as ordered  - Monitor electrolytes and administer replacement therapy as ordered  Outcome: Progressing     Problem: MUSCULOSKELETAL - ADULT  Goal: Maintain or return mobility to safest level of function  Description: INTERVENTIONS:  - Assess patient's ability to carry out ADLs; assess patient's baseline for ADL function and identify physical deficits which impact ability to perform ADLs (bathing, care of mouth/teeth, toileting, grooming, dressing, etc.)  - Assess/evaluate cause of self-care deficits   - Assess range of motion  - Assess patient's mobility  - Assess patient's need for assistive devices and provide as appropriate  - Encourage maximum independence but intervene and supervise when necessary  - Involve family in performance of ADLs  - Assess for home care needs following discharge   - Consider OT consult to assist with ADL evaluation and planning for discharge  - Provide patient education as appropriate  Outcome: Progressing

## 2024-11-28 NOTE — PLAN OF CARE
Problem: SAFETY ADULT  Goal: Patient will remain free of falls  Description: INTERVENTIONS:  - Educate patient/family on patient safety including physical limitations  - Instruct patient to call for assistance with activity   - Consult OT/PT to assist with strengthening/mobility   - Keep Call bell within reach  - Keep bed low and locked with side rails adjusted as appropriate  - Keep care items and personal belongings within reach  - Initiate and maintain comfort rounds  - Make Fall Risk Sign visible to staff  - Offer Toileting every 2 Hours, in advance of need  - Initiate/Maintain bed/chair alarm  - Obtain necessary fall risk management equipment  - Apply yellow socks and bracelet for high fall risk patients  - Consider moving patient to room near nurses station  11/28/2024 1219 by Malia Rojo RN  Outcome: Adequate for Discharge  11/28/2024 0958 by Malia Rojo RN  Outcome: Progressing     Problem: Neurological Deficit  Goal: Neurological status is stable or improving  Description: Interventions:  - Monitor and assess patient's level of consciousness, motor function, sensory function, and level of assistance needed for ADLs.   - Monitor and report changes from baseline. Collaborate with interdisciplinary team to initiate plan and implement interventions as ordered.   - Provide and maintain a safe environment.  - Consider seizure precautions.  - Consider fall precautions.  - Consider aspiration precautions.  - Consider bleeding precautions.  11/28/2024 1219 by Malia Rojo RN  Outcome: Adequate for Discharge  11/28/2024 0958 by Malia Rojo RN  Outcome: Progressing     Problem: Activity Intolerance/Impaired Mobility  Goal: Mobility/activity is maintained at optimum level for patient  Description: Interventions:  - Assess and monitor patient  barriers to mobility and need for assistive/adaptive devices.  - Assess patient's emotional response to limitations.  - Collaborate with interdisciplinary  team and initiate plans and interventions as ordered.  - Encourage independent activity per ability.  - Maintain proper body alignment.  - Perform active/passive rom as tolerated/ordered.  - Plan activities to conserve energy.  - Turn patient as appropriate  11/28/2024 1219 by Malia Rojo RN  Outcome: Adequate for Discharge  11/28/2024 0958 by Malia Rojo RN  Outcome: Progressing     Problem: Communication Impairment  Goal: Ability to express needs and understand communication  Description: Assess patient's communication skills and ability to understand information.  Patient will demonstrate use of effective communication techniques, alternative methods of communication and understanding even if not able to speak.     - Encourage communication and provide alternate methods of communication as needed.  - Collaborate with case management/ for discharge needs.  - Include patient/family/caregiver in decisions related to communication.  11/28/2024 1219 by Malia Rojo RN  Outcome: Adequate for Discharge  11/28/2024 0958 by Malia Rojo RN  Outcome: Progressing     Problem: Potential for Aspiration  Goal: Non-ventilated patient's risk of aspiration is minimized  Description: Assess and monitor vital signs, respiratory status, and labs (WBC).  Monitor for signs of aspiration (tachypnea, cough, rales, wheezing, cyanosis, fever).    - Assess and monitor patient's ability to swallow.  - Place patient up in chair to eat if possible.  - HOB up at 90 degrees to eat if unable to get patient up into chair.  - Supervise patient during oral intake.   - Instruct patient/ family to take small bites.  - Instruct patient/ family to take small single sips when taking liquids.  - Follow patient-specific strategies generated by speech pathologist.  11/28/2024 1219 by Malia Rojo RN  Outcome: Adequate for Discharge  11/28/2024 0958 by Malia Rojo RN  Outcome: Progressing  Goal: Ventilated  patient's risk of aspiration is minimized  Description: Assess and monitor vital signs, respiratory status, airway cuff pressure, and labs (WBC).  Monitor for signs of aspiration (tachypnea, cough, rales, wheezing, cyanosis, fever).    - Elevate head of bed 30 degrees if patient has tube feeding.  - Monitor tube feeding.  11/28/2024 1219 by Malia Rojo RN  Outcome: Adequate for Discharge  11/28/2024 0958 by Malia Rojo RN  Outcome: Progressing     Problem: Nutrition  Goal: Nutrition/Hydration status is improving  Description: Monitor and assess patient's nutrition/hydration status for malnutrition (ex- brittle hair, bruises, dry skin, pale skin and conjunctiva, muscle wasting, smooth red tongue, and disorientation). Collaborate with interdisciplinary team and initiate plan and interventions as ordered.  Monitor patient's weight and dietary intake as ordered or per policy. Utilize nutrition screening tool and intervene per policy. Determine patient's food preferences and provide high-protein, high-caloric foods as appropriate.     - Assist patient with eating.  - Allow adequate time for meals.  - Encourage patient to take dietary supplement as ordered.  - Collaborate with clinical nutritionist.  - Include patient/family/caregiver in decisions related to nutrition.  11/28/2024 1219 by Malia Rojo RN  Outcome: Adequate for Discharge  11/28/2024 0958 by Malia Rojo RN  Outcome: Progressing     Problem: NEUROSENSORY - ADULT  Goal: Achieves stable or improved neurological status  Description: INTERVENTIONS  - Monitor and report changes in neurological status  - Monitor vital signs such as temperature, blood pressure, glucose, and any other labs ordered   - Initiate measures to prevent increased intracranial pressure  - Monitor for seizure activity and implement precautions if appropriate      11/28/2024 1219 by Malia Rojo RN  Outcome: Adequate for Discharge  11/28/2024 0958 by Malia  PRASHANT Rojo  Outcome: Progressing  Goal: Remains free of injury related to seizures activity  Description: INTERVENTIONS  - Maintain airway, patient safety  and administer oxygen as ordered  - Monitor patient for seizure activity, document and report duration and description of seizure to physician/advanced practitioner  - If seizure occurs,  ensure patient safety during seizure  - Reorient patient post seizure  - Seizure pads on all 4 side rails  - Instruct patient/family to notify RN of any seizure activity including if an aura is experienced  - Instruct patient/family to call for assistance with activity based on nursing assessment  - Administer anti-seizure medications if ordered    11/28/2024 1219 by Malia Rojo RN  Outcome: Adequate for Discharge  11/28/2024 0958 by Malia Rojo RN  Outcome: Progressing  Goal: Achieves maximal functionality and self care  Description: INTERVENTIONS  - Monitor swallowing and airway patency with patient fatigue and changes in neurological status  - Encourage and assist patient to increase activity and self care.   - Encourage visually impaired, hearing impaired and aphasic patients to use assistive/communication devices  11/28/2024 1219 by Malia Rojo RN  Outcome: Adequate for Discharge  11/28/2024 0958 by Malia Rojo RN  Outcome: Progressing     Problem: CARDIOVASCULAR - ADULT  Goal: Maintains optimal cardiac output and hemodynamic stability  Description: INTERVENTIONS:  - Monitor I/O, vital signs and rhythm  - Monitor for S/S and trends of decreased cardiac output  - Administer and titrate ordered vasoactive medications to optimize hemodynamic stability  - Assess quality of pulses, skin color and temperature  - Assess for signs of decreased coronary artery perfusion  - Instruct patient to report change in severity of symptoms  11/28/2024 1219 by Malia Rojo RN  Outcome: Adequate for Discharge  11/28/2024 0958 by Malia Rojo RN  Outcome:  Progressing  Goal: Absence of cardiac dysrhythmias or at baseline rhythm  Description: INTERVENTIONS:  - Continuous cardiac monitoring, vital signs, obtain 12 lead EKG if ordered  - Administer antiarrhythmic and heart rate control medications as ordered  - Monitor electrolytes and administer replacement therapy as ordered  11/28/2024 1219 by Malia Rojo RN  Outcome: Adequate for Discharge  11/28/2024 0958 by Malia Rojo RN  Outcome: Progressing     Problem: MUSCULOSKELETAL - ADULT  Goal: Maintain or return mobility to safest level of function  Description: INTERVENTIONS:  - Assess patient's ability to carry out ADLs; assess patient's baseline for ADL function and identify physical deficits which impact ability to perform ADLs (bathing, care of mouth/teeth, toileting, grooming, dressing, etc.)  - Assess/evaluate cause of self-care deficits   - Assess range of motion  - Assess patient's mobility  - Assess patient's need for assistive devices and provide as appropriate  - Encourage maximum independence but intervene and supervise when necessary  - Involve family in performance of ADLs  - Assess for home care needs following discharge   - Consider OT consult to assist with ADL evaluation and planning for discharge  - Provide patient education as appropriate  11/28/2024 1219 by Malia Rojo RN  Outcome: Adequate for Discharge  11/28/2024 0958 by Malia Rojo RN  Outcome: Progressing

## 2024-11-29 ENCOUNTER — TELEPHONE (OUTPATIENT)
Age: 49
End: 2024-11-29

## 2024-11-29 NOTE — TELEPHONE ENCOUNTER
Scheduled with gonzalo finch, 1/28/2025, 9:30 am.     Thank you,     Dagmar       [facility]/ CYNTHIA HUMPHRIES/ Stroke-like symptoms     DC - 11/28/2024- HOME     ----- Message from Casper Gonzáles DO sent at 11/27/2024  4:19 PM EST -----  Regarding: HFU  Ralph Flowers will need follow-up in in 6 weeks with neurovascular team for Embolic stroke secondary to known diagnosis of atrial fibrillation in 60 minute appointment. They will not require outpatient neurological testing

## 2024-12-03 LAB
DME PARACHUTE DELIVERY DATE ACTUAL: NORMAL
DME PARACHUTE DELIVERY DATE REQUESTED: NORMAL
DME PARACHUTE ITEM DESCRIPTION: NORMAL
DME PARACHUTE ORDER STATUS: NORMAL
DME PARACHUTE SUPPLIER NAME: NORMAL
DME PARACHUTE SUPPLIER PHONE: NORMAL

## 2024-12-04 ENCOUNTER — TELEPHONE (OUTPATIENT)
Dept: NEUROLOGY | Facility: CLINIC | Age: 49
End: 2024-12-04

## 2024-12-04 NOTE — TELEPHONE ENCOUNTER
Post CVA Discharge Follow Up  Hospitalization: 11/26/24-11/28/24    Called patient to obtain an update. Since discharge, he denies experiencing any new or worsening stroke-like symptoms.     Patient reports he continues to have the following symptoms: decreased energy levels and imbalance. He claims his symptoms have slightly improved since discharge.     Patient mobilizes around the house with a cane and he will use a walker for longer distances outside of the home.    Reviewed appointments - patient has not followed up with a PCP yet. Expressed the importance of following up with a PCP ASAP. Patient is scheduled to see neurology on 1/28/25.    Reviewed neurology-related medications with him. Reports he is taking as prescribed with no medication side effects or signs of bleeding.     During this call, we reviewed stroke-like symptoms, personal risk factors and management, medications.    As for risk factors, patient reports how he is not monitoring his BS at home since he does not have test strips. He reports the freestyle logan is on back order. He has lancets and the meter at home. Advised patient to call the PCP office immediately to help address this issue. Cautioned patient on the dangers of hypoglycemia and hyperglycemia. Patient will call the PCP today. Patient expressed understanding.  Patient is not checking his BP at home. Patient has a cuff at home. He will start checking his BP at home.  He is a non smoker.    Encouraged patient to follow a well balanced, diabetic diet.    All of his questions were addressed. At the conclusion of the conversation, patient denies having any further questions or concerns.

## 2025-01-28 ENCOUNTER — OFFICE VISIT (OUTPATIENT)
Dept: NEUROLOGY | Facility: CLINIC | Age: 50
End: 2025-01-28
Payer: COMMERCIAL

## 2025-01-28 VITALS
HEIGHT: 70 IN | TEMPERATURE: 96.4 F | WEIGHT: 277 LBS | DIASTOLIC BLOOD PRESSURE: 70 MMHG | BODY MASS INDEX: 39.65 KG/M2 | HEART RATE: 70 BPM | OXYGEN SATURATION: 96 % | SYSTOLIC BLOOD PRESSURE: 110 MMHG

## 2025-01-28 DIAGNOSIS — I10 ESSENTIAL HYPERTENSION: ICD-10-CM

## 2025-01-28 DIAGNOSIS — E11.65 TYPE 2 DIABETES MELLITUS WITH HYPERGLYCEMIA (HCC): ICD-10-CM

## 2025-01-28 DIAGNOSIS — I48.0 PAROXYSMAL ATRIAL FIBRILLATION (HCC): ICD-10-CM

## 2025-01-28 DIAGNOSIS — E78.5 HYPERLIPIDEMIA: Primary | ICD-10-CM

## 2025-01-28 DIAGNOSIS — Z86.73 HISTORY OF EMBOLIC STROKE: ICD-10-CM

## 2025-01-28 DIAGNOSIS — G47.33 OBSTRUCTIVE SLEEP APNEA: ICD-10-CM

## 2025-01-28 PROCEDURE — 99215 OFFICE O/P EST HI 40 MIN: CPT

## 2025-01-28 NOTE — ASSESSMENT & PLAN NOTE
- Recent  mg/dL  - Continue atorvastatin 80 mg daily  - Complete repeat lipid panel before next appointment  Orders:    Lipid Panel with Direct LDL reflex; Future

## 2025-01-28 NOTE — ASSESSMENT & PLAN NOTE
- Ambulatory referral to sleep medicine to rule out any concerns of sleep apnea and see if there is any concern over the patient may need a CPAP for obstructive sleep apnea in the future.  Orders:    Ambulatory Referral to Sleep Medicine; Future

## 2025-01-28 NOTE — PATIENT INSTRUCTIONS
- Ambulatory referral to cardiology provided to the patient.  Patient is currently on Eliquis 5 mg twice daily for anticoagulation for paroxysmal atrial fibrillation.  Would like for the patient to follow with a cardiologist in the future for further evaluation and monitoring in regards to patient's A-fib and anticoagulation.  Also to evaluate the patient's antihypertensive medications and continue to provide any adjustment if needed.  - Ambulatory referral to endocrinology for further evaluation of the patient's type 2 diabetes mellitus.  Most recent hemoglobin A1c 12.7%, patient is currently taking metformin without send milligrams twice daily with meals. Would like for the patient to be evaluated by endocrinology for any further intervention that is required.  - Ambulatory referral to sleep medicine to evaluate and rule out any concerns in regards to JAVON.  Patient reported previous history of JAVON in the past, does not currently use CPAP at this time.  - Complete lipid panel before next appointment to evaluate patient's cholesterol and make sure that the atorvastatin 80 mg daily is appropriate for him.    - For ongoing stroke prevention continue: Eliquis 5 mg twice daily, aspirin 81 mg once daily, atorvastatin 80 mg once daily    - Discussed the importance of antiplatelet/anticoagulation management with the patient to prevent future strokes.   - Recommend to check blood pressure occasionally away from the doctor's office to make sure that those numbers are typically less than 130/80.  If they are frequently higher than that, we recommend checking a little more often and to follow up with primary care team   - Will defer to primary care team for monitoring of cholesterol panel and blood sugar numbers with target LDL cholesterol of less than 70 and hemoglobin A1c less than 7%  - Recommend following a low salt, mediterranean diet   - Recommend routine physical exercise as tolerated     We will plan for him to return  to the office in 4 months time to see on of the APPs or Dr. Michael but would be happy to see him sooner if the need should arise.  If he has any symptoms concerning for TIA or stroke including sudden painless loss of vision or double vision, difficulty speaking or swallowing, vertigo/room spinning that does not quickly resolve, or weakness/numbness/loss of coordination affecting 1 side of the face or body he should proceed by ambulance to the nearest emergency room immediately.

## 2025-01-28 NOTE — ASSESSMENT & PLAN NOTE
Lab Results   Component Value Date    HGBA1C 12.9 (H) 11/27/2024    HGBA1C 12.7 (H) 11/27/2024     - Continue current diabetic medication regimen with metformin 1000 mg twice daily  - Ambulatory referral to endocrinology, advised patient to follow-up in light of recent elevated hemoglobin A1c readings  Orders:    Ambulatory Referral to Endocrinology; Future

## 2025-01-28 NOTE — ASSESSMENT & PLAN NOTE
I had the pleasure of seeing Ralph today in the office at [facility] in [place].  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  With additional artifact versus acute to subacute infarct of the left posterior frontal lobe.  The patient states since the hospitalization he has been doing well and not experiencing any new strokelike symptoms.  He notes that he is still having issues in regards to his gait and balance.  Noting that sometimes he is veering to the left when he is walking but has not fallen over at all.  He is currently taking Eliquis 5 mg twice daily, suspected that the patient's stroke was likely secondary due to uncontrolled A-fib in the setting of warfarin failure/noncompliance.  Now on anticoagulation with Eliquis and doing fine.  Still also taking aspirin 81 mg daily.  Due to significant intracranial stenosis/atherosclerotic disease believe that is fine the patient continues with aspirin at this time.  Still continues on atorvastatin 80 mg daily at this time.  The patient does not have a cardiologist that he follows with for atrial fibrillation or for hypertension.  Advised patient to follow-up on ambulatory referral to cardiology to be evaluated.  Also placed ambulatory referral to endocrinology for the patient to be evaluated in regards to diabetes mellitus.  Most recent hemoglobin A1c was 12.7%.  Currently taking metformin 1000 mg twice daily.  Also patient has past history of obstructive sleep apnea but not currently utilizing CPAP.  Advised patient to also follow-up with ambulatory referral to sleep medicine as well.  Encouraged patient to continue to work on diet and exercise in regards to potentially preventing another stroke in the future.  Advised patient to continue on all medications he was previously taking.  Advised patient to follow-up in approximately 4  months time with Gabe LEYVA for further evaluation.    - Ambulatory referral to cardiology provided to the patient.  Patient is currently on Eliquis 5 mg twice daily for anticoagulation for paroxysmal atrial fibrillation.  Would like for the patient to follow with a cardiologist in the future for further evaluation and monitoring in regards to patient's A-fib and anticoagulation.  Also to evaluate the patient's antihypertensive medications and continue to provide any adjustment if needed.  - Ambulatory referral to endocrinology for further evaluation of the patient's type 2 diabetes mellitus.  Most recent hemoglobin A1c 12.7%, patient is currently taking metformin without send milligrams twice daily with meals. Would like for the patient to be evaluated by endocrinology for any further intervention that is required.  - Ambulatory referral to sleep medicine to evaluate and rule out any concerns in regards to JAVON.  Patient reported previous history of JAVON in the past, does not currently use CPAP at this time.  - Complete lipid panel before next appointment to evaluate patient's cholesterol and make sure that the atorvastatin 80 mg daily is appropriate for him.    - For ongoing stroke prevention continue: Eliquis 5 mg twice daily, aspirin 81 mg once daily, atorvastatin 80 mg once daily    - Discussed the importance of antiplatelet/anticoagulation management with the patient to prevent future strokes.   - Recommend to check blood pressure occasionally away from the doctor's office to make sure that those numbers are typically less than 130/80.  If they are frequently higher than that, we recommend checking a little more often and to follow up with primary care team   - Will defer to primary care team for monitoring of cholesterol panel and blood sugar numbers with target LDL cholesterol of less than 70 and hemoglobin A1c less than 7%  - Recommend following a low salt, mediterranean diet   - Recommend routine physical  exercise as tolerated     Orders:    Lipid Panel with Direct LDL reflex; Future    Ambulatory Referral to Cardiology; Future    Ambulatory Referral to Endocrinology; Future    Ambulatory Referral to Sleep Medicine; Future

## 2025-01-28 NOTE — ASSESSMENT & PLAN NOTE
- Previous history of atrial fibrillation, previously on warfarin.  Patient now currently taking Eliquis 5 mg twice daily for anticoagulation  - Encouraged to follow-up on ambulatory referral to cardiology for further evaluation of atrial fibrillation and continuing to monitor patient while taking Eliquis for anticoagulation.  Orders:    Ambulatory Referral to Cardiology; Future

## 2025-01-28 NOTE — PROGRESS NOTES
Name: Ralph Flowers      : 1975      MRN: [id number]  Encounter Provider: Jay Gifford PA-C  Encounter Date: 2025   Encounter department: [facility]  :  Assessment & Plan  History of embolic stroke  I had the pleasure of seeing Ralph today in the office at [facility] in [place].  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  With additional artifact versus acute to subacute infarct of the left posterior frontal lobe.  The patient states since the hospitalization he has been doing well and not experiencing any new strokelike symptoms.  He notes that he is still having issues in regards to his gait and balance.  Noting that sometimes he is veering to the left when he is walking but has not fallen over at all.  He is currently taking Eliquis 5 mg twice daily, suspected that the patient's stroke was likely secondary due to uncontrolled A-fib in the setting of warfarin failure/noncompliance.  Now on anticoagulation with Eliquis and doing fine.  Still also taking aspirin 81 mg daily.  Due to significant intracranial stenosis/atherosclerotic disease believe that is fine the patient continues with aspirin at this time.  Still continues on atorvastatin 80 mg daily at this time.  The patient does not have a cardiologist that he follows with for atrial fibrillation or for hypertension.  Advised patient to follow-up on ambulatory referral to cardiology to be evaluated.  Also placed ambulatory referral to endocrinology for the patient to be evaluated in regards to diabetes mellitus.  Most recent hemoglobin A1c was 12.7%.  Currently taking metformin 1000 mg twice daily.  Also patient has past history of obstructive sleep apnea but not currently utilizing CPAP.  Advised patient to also follow-up with ambulatory referral to sleep medicine as  well.  Encouraged patient to continue to work on diet and exercise in regards to potentially preventing another stroke in the future.  Advised patient to continue on all medications he was previously taking.  Advised patient to follow-up in approximately 4 months time with Gabe LEYVA for further evaluation.    - Ambulatory referral to cardiology provided to the patient.  Patient is currently on Eliquis 5 mg twice daily for anticoagulation for paroxysmal atrial fibrillation.  Would like for the patient to follow with a cardiologist in the future for further evaluation and monitoring in regards to patient's A-fib and anticoagulation.  Also to evaluate the patient's antihypertensive medications and continue to provide any adjustment if needed.  - Ambulatory referral to endocrinology for further evaluation of the patient's type 2 diabetes mellitus.  Most recent hemoglobin A1c 12.7%, patient is currently taking metformin without send milligrams twice daily with meals. Would like for the patient to be evaluated by endocrinology for any further intervention that is required.  - Ambulatory referral to sleep medicine to evaluate and rule out any concerns in regards to JAVON.  Patient reported previous history of JAVON in the past, does not currently use CPAP at this time.  - Complete lipid panel before next appointment to evaluate patient's cholesterol and make sure that the atorvastatin 80 mg daily is appropriate for him.    - For ongoing stroke prevention continue: Eliquis 5 mg twice daily, aspirin 81 mg once daily, atorvastatin 80 mg once daily    - Discussed the importance of antiplatelet/anticoagulation management with the patient to prevent future strokes.   - Recommend to check blood pressure occasionally away from the doctor's office to make sure that those numbers are typically less than 130/80.  If they are frequently higher than that, we recommend checking a little more often and to follow up with primary care team   -  Will defer to primary care team for monitoring of cholesterol panel and blood sugar numbers with target LDL cholesterol of less than 70 and hemoglobin A1c less than 7%  - Recommend following a low salt, mediterranean diet   - Recommend routine physical exercise as tolerated     Orders:    Lipid Panel with Direct LDL reflex; Future    Ambulatory Referral to Cardiology; Future    Ambulatory Referral to Endocrinology; Future    Ambulatory Referral to Sleep Medicine; Future    Paroxysmal atrial fibrillation (HCC)  - Previous history of atrial fibrillation, previously on warfarin.  Patient now currently taking Eliquis 5 mg twice daily for anticoagulation  - Encouraged to follow-up on ambulatory referral to cardiology for further evaluation of atrial fibrillation and continuing to monitor patient while taking Eliquis for anticoagulation.  Orders:    Ambulatory Referral to Cardiology; Future    Essential hypertension  - /70 today in the office  - Currently taking lisinopril 40 mg daily, hydrochlorothiazide 25 mg, diltiazem 120 mg daily, and amlodipine 5 mg daily for antihypertensive control  - Advised patient to follow-up with cardiology moving forward in regards to antihypertensive therapy and further evaluation of the patient's hypertension moving forward.    Orders:    Ambulatory Referral to Cardiology; Future    Hyperlipidemia  - Recent  mg/dL  - Continue atorvastatin 80 mg daily  - Complete repeat lipid panel before next appointment  Orders:    Lipid Panel with Direct LDL reflex; Future    Type 2 diabetes mellitus with hyperglycemia (HCC)    Lab Results   Component Value Date    HGBA1C 12.9 (H) 11/27/2024    HGBA1C 12.7 (H) 11/27/2024     - Continue current diabetic medication regimen with metformin 1000 mg twice daily  - Ambulatory referral to endocrinology, advised patient to follow-up in light of recent elevated hemoglobin A1c readings  Orders:    Ambulatory Referral to Endocrinology;  Future    Obstructive sleep apnea  - Ambulatory referral to sleep medicine to rule out any concerns of sleep apnea and see if there is any concern over the patient may need a CPAP for obstructive sleep apnea in the future.  Orders:    Ambulatory Referral to Sleep Medicine; Future      Patient Instructions   - Ambulatory referral to cardiology provided to the patient.  Patient is currently on Eliquis 5 mg twice daily for anticoagulation for paroxysmal atrial fibrillation.  Would like for the patient to follow with a cardiologist in the future for further evaluation and monitoring in regards to patient's A-fib and anticoagulation.  Also to evaluate the patient's antihypertensive medications and continue to provide any adjustment if needed.  - Ambulatory referral to endocrinology for further evaluation of the patient's type 2 diabetes mellitus.  Most recent hemoglobin A1c 12.7%, patient is currently taking metformin without send milligrams twice daily with meals.  Would like for the patient to be evaluated by endocrinology for any further intervention that is required.  - Ambulatory referral to sleep medicine to evaluate and rule out any concerns in regards to JAVON.  Patient reported previous history of JAVON in the past, does not currently use CPAP at this time.  - Complete lipid panel before next appointment to evaluate patient's cholesterol and make sure that the atorvastatin 80 mg daily is appropriate for him.    - For ongoing stroke prevention continue: Eliquis 5 mg twice daily, aspirin 81 mg once daily, atorvastatin 80 mg once daily    - Discussed the importance of antiplatelet/anticoagulation management with the patient to prevent future strokes.   - Recommend to check blood pressure occasionally away from the doctor's office to make sure that those numbers are typically less than 130/80.  If they are frequently higher than that, we recommend checking a little more often and to follow up with primary care team   -  "Will defer to primary care team for monitoring of cholesterol panel and blood sugar numbers with target LDL cholesterol of less than 70 and hemoglobin A1c less than 7%  - Recommend following a low salt, mediterranean diet   - Recommend routine physical exercise as tolerated     We will plan for him to return to the office in 4 months time to see on of the APPs or Dr. Michael but would be happy to see him sooner if the need should arise.  If he has any symptoms concerning for TIA or stroke including sudden painless loss of vision or double vision, difficulty speaking or swallowing, vertigo/room spinning that does not quickly resolve, or weakness/numbness/loss of coordination affecting 1 side of the face or body he should proceed by ambulance to the nearest emergency room immediately.      History of Present Illness   HPI    For Review/Hospital Course:    \"Patient with hx of stroke in 2021 (residual right sided facial numbness) presents with symptoms described as feeling \"off balance,\" drifting toward left side while walking since Sunday. LKW was Sunday morning. NIHSS = 0.      11/26/24 CTH:  No acute large vessel distribution infarct, hemorrhage, mass effect, shift or herniation. Age indeterminate lacunar infarct in the left mary beth that may correspond with finding described on outside MRI. White matter changes likely due to chronic microangiopathy.     11/26/24 CTA: No significant stenosis of the cervical carotid arteries.Small left vertebral artery with proximal occlusion of the left V4 segment that could be chronic. Patent left PICA that arises from the distal V3 segment.No other large vessel occlusion. Severe stenosis in the right A2 segment and proximal left A3 segment. Mild narrowing in the M1 segment. Multifocal moderate and severe stenosis of the bilateral M2 branches, left worse than right. Severe focal stenosis in the dominant right vertebral artery. Severe stenosis in the bilateral P2 segments.      11/27/24 " "MRI brain: Motion-degraded examination including the DWI sequence limiting evaluation. Small area of acute to subacute infarct within the left lateral medulla. Additional artifact versus acute to subacute infarct within the left posterior frontal lobe. 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 T2/FLAIR hyperintensities within the periventricular and deep white matter, nonspecific however distribution may be suggestive of demyelinating disease in the correct clinical setting. Differential includes precocious microangiopathic changes, infectious/inflammatory process among other etiologies.     11/26/24 TTE:  Left ventricular cavity size is mildly dilated. Wall thickness is mildly increased. The left ventricular ejection fraction is 50-55%. Systolic function is low normal. Although no diagnostic regional wall motion abnormality was identified, this possibility cannot be completely excluded on the basis of this study. Diastolic function is normal.  Right ventricular cavity size is dilated. Systolic function is normal.     Assessment: suspect acute infarct within left lateral medulla and left posterior frontal lobe, likely embolic in nature secondary to uncontrolled Afib in the setting of medication noncompliance\" - per Cici Chin DO, 11/27/2024      Interval History:    New stroke symptoms/residual symptoms:    Any new, sudden onset weakness, numbness, facial droop, slurred speech, difficulty speaking, trouble swallowing, persistent vertigo, or sudden double vision or vision loss?  No new stroke-like symptoms    Residual symptoms include: vertigo/balance or coordination issues, veering to the left when walking    Stroke Etiology and Risk Factor modification:    This was a(n) embolic stroke, most likely related to Cardioembolic: Non-Valvular A-Fib    Stroke risk factors were evaluated including: hypertension, hyperlipidemia, Type 2 diabetes mellitus, PAF, JAVON    AP/AC therapy: Eliquis 5 mg twice " daily and aspirin 81 mg once daily. Does not have any significant issues, some bleeding of the nose that starts and stops. No blood in stool, blood in urine, etc.     Statin therapy: atorvastatin 80 mg once daily.     Blood pressure today and as of late: 110/70 BP today in the office. Does not monitor his blood pressure at home. Taking a few different antihypertensive medications     Most recent LDL: 132 mg/dL    Most recent hemoglobin A1C: 12.7%    Cardiology evaluation? Any cardiac monitoring required?:  Does not have cardiology follow up at this time.  No cardiac devices at this time.  Does have atrial fibrillation currently taking Eliquis 5 mg twice daily.    Endocrinology evaluation? Following proper glycemic treatment/diet?: None    Vascular surgery evaluation? Monitoring of carotid artery ultrasound required? None    Lifestyle history/modifications:    Diet/Exercise regimen: Does not have much of an appetite. Sometimes eating balanced meals and sometimes not. Has been trying to walk more.     Any physical therapy, occupational therapy or speech therapy performed/required at this time?: PT he has been doing at [facility] in regards to veering to the left and some balance difficulty. Has noticed this has significantly improved.       Any difficulty with sleeping? Any history of sleep apnea? CPAP compliance?: Has always had issues with sleeping he states. Does sleep 2 hours at time. Sleeps 4-6 hours at night. Did have sleep apnea in the past, had a CPAP mask in the past but not treated currently.     Post-stroke depression/anxiety? Does have stress at times he states     Any history of smoking or tobacco usage?: No tobacco use     Review of Systems I have personally reviewed the MA's review of systems and made changes as necessary.    Medical History Reviewed by provider this encounter:     .  Past Medical History   Past Medical History:   Diagnosis Date    Atrial fibrillation (HCC)     Diabetes mellitus (HCC)      Hyperlipidemia     Hypertension     Obesity     Stroke (HCC)      No past surgical history on file.  No family history on file.   reports that he has never smoked. He has never used smokeless tobacco.  Current Outpatient Medications on File Prior to Visit   Medication Sig Dispense Refill    Alcohol Swabs 70 % PADS May substitute brand based on insurance coverage. Check glucose TID. 100 each 0    Blood Glucose Monitoring Suppl (OneTouch Verio Reflect) w/Device KIT May substitute brand based on insurance coverage. Check glucose TID. 1 kit 0    Continuous Glucose Sensor (FreeStyle Fredis 3 Plus Sensor) MISC Use 1 Units every 14 (fourteen) days 4 each 0    EPINEPHrine (EPIPEN) 0.3 mg/0.3 mL SOAJ Inject as directed      metFORMIN (GLUCOPHAGE) 1000 MG tablet Take 1 tablet (1,000 mg total) by mouth 2 (two) times a day with meals 60 tablet 0    OneTouch Delica Lancets 33G MISC May substitute brand based on insurance coverage. Check glucose TID. 100 each 0    polyethylene glycol (MIRALAX) 17 g packet Take 17 g by mouth daily 510 g 0    amLODIPine (NORVASC) 5 mg tablet Take 1 tablet (5 mg total) by mouth daily 30 tablet 0    apixaban (Eliquis) 5 mg Take 1 tablet (5 mg total) by mouth 2 (two) times a day 60 tablet 0    aspirin (Aspirin 81) 81 mg EC tablet Take 1 tablet (81 mg total) by mouth daily 30 tablet 0    atorvastatin (LIPITOR) 80 mg tablet Take 1 tablet (80 mg total) by mouth daily 30 tablet 0    diltiazem (CARDIZEM CD) 120 mg 24 hr capsule Take 1 capsule (120 mg total) by mouth daily 30 capsule 0    hydroCHLOROthiazide 25 mg tablet Take 1 tablet (25 mg total) by mouth daily 30 tablet 0    levothyroxine 50 mcg tablet Take 1 tablet (50 mcg total) by mouth daily 30 tablet 0    lisinopril (ZESTRIL) 40 mg tablet Take 1 tablet (40 mg total) by mouth daily 30 tablet 0     No current facility-administered medications on file prior to visit.     Allergies   Allergen Reactions    Bee Pollen Hives     Reaction Date: 13Oct2011;        Current Outpatient Medications on File Prior to Visit   Medication Sig Dispense Refill    Alcohol Swabs 70 % PADS May substitute brand based on insurance coverage. Check glucose TID. 100 each 0    Blood Glucose Monitoring Suppl (OneTouch Verio Reflect) w/Device KIT May substitute brand based on insurance coverage. Check glucose TID. 1 kit 0    Continuous Glucose Sensor (FreeStyle Fredis 3 Plus Sensor) MISC Use 1 Units every 14 (fourteen) days 4 each 0    EPINEPHrine (EPIPEN) 0.3 mg/0.3 mL SOAJ Inject as directed      metFORMIN (GLUCOPHAGE) 1000 MG tablet Take 1 tablet (1,000 mg total) by mouth 2 (two) times a day with meals 60 tablet 0    OneTouch Delica Lancets 33G MISC May substitute brand based on insurance coverage. Check glucose TID. 100 each 0    polyethylene glycol (MIRALAX) 17 g packet Take 17 g by mouth daily 510 g 0    amLODIPine (NORVASC) 5 mg tablet Take 1 tablet (5 mg total) by mouth daily 30 tablet 0    apixaban (Eliquis) 5 mg Take 1 tablet (5 mg total) by mouth 2 (two) times a day 60 tablet 0    aspirin (Aspirin 81) 81 mg EC tablet Take 1 tablet (81 mg total) by mouth daily 30 tablet 0    atorvastatin (LIPITOR) 80 mg tablet Take 1 tablet (80 mg total) by mouth daily 30 tablet 0    diltiazem (CARDIZEM CD) 120 mg 24 hr capsule Take 1 capsule (120 mg total) by mouth daily 30 capsule 0    hydroCHLOROthiazide 25 mg tablet Take 1 tablet (25 mg total) by mouth daily 30 tablet 0    levothyroxine 50 mcg tablet Take 1 tablet (50 mcg total) by mouth daily 30 tablet 0    lisinopril (ZESTRIL) 40 mg tablet Take 1 tablet (40 mg total) by mouth daily 30 tablet 0     No current facility-administered medications on file prior to visit.      Social History     Tobacco Use    Smoking status: Never    Smokeless tobacco: Never   Vaping Use    Vaping status: Never Used   Substance and Sexual Activity    Alcohol use: Not on file    Drug use: Not on file    Sexual activity: Not on file        Objective   /70 (BP  "Location: Left arm, Patient Position: Sitting, Cuff Size: Adult)   Pulse 70   Temp (!) 96.4 °F (35.8 °C) (Temporal)   Ht 5' 10\" (1.778 m)   Wt 126 kg (277 lb)   SpO2 96%   BMI 39.75 kg/m²     Physical Exam  Neurological Exam    Physical Exam:                                                                 Vitals:            Constitutional:    /70 (BP Location: Left arm, Patient Position: Sitting, Cuff Size: Adult)   Pulse 70   Temp (!) 96.4 °F (35.8 °C) (Temporal)   Ht 5' 10\" (1.778 m)   Wt 126 kg (277 lb)   SpO2 96%   BMI 39.75 kg/m²   BP Readings from Last 3 Encounters:   01/28/25 110/70   11/28/24 156/94   01/19/23 (!) 205/117     Pulse Readings from Last 3 Encounters:   01/28/25 70   11/28/24 79   01/19/23 84         Well developed, well nourished, well groomed. No dysmorphic features.       Psychiatric:  Normal behavior and appropriate affect        Neurological Examination:     Mental status/cognitive function:   Orientated to time, place and person. Recent and remote memory intact. Attention span and concentration as well as fund of knowledge are appropriate for age. Normal language and spontaneous speech.    Cranial Nerves:  II-visual fields full.   III, IV, VI-Pupils were equal, round, and reactive to light and accomodation. Extraocular movements were full and conjugate without nystagmus. Conjugate gaze, normal smooth pursuits, normal saccades   V-facial sensation symmetric.    VII-facial expression symmetric, intact forehead wrinkle, strong eye closure, symmetric smile    VIII-hearing grossly intact bilaterally   IX, X-palate elevation symmetric, no dysarthria.   XI-shoulder shrug strength intact    XII-tongue protrusion midline.    Motor Exam: symmetric bulk and tone throughout, no pronator drift. Power/strength 5/5 bilateral upper and lower extremities, no atrophy, fasciculations or abnormal movements noted.   Sensory: grossly intact light touch in all extremities.   Reflexes: " brachioradialis 1+, biceps 1+, knee 1+ bilaterally  Coordination: Finger nose finger intact bilaterally, no apparent dysmetria, ataxia or tremor noted  Gait: Currently using a cane to ambulate, unsteady gait does veer to the left when walking    Results/Data:    CT stroke alert brain, 11/26/2024:    IMPRESSION:     No acute large vessel distribution infarct, hemorrhage, mass effect, shift or herniation. Age indeterminate lacunar infarct in the left mary beth that may correspond with finding described on outside MRI.  White matter changes likely due to chronic microangiopathy.    CTA stroke alert (head/neck), 11/26/2024:    IMPRESSION:     No significant stenosis of the cervical carotid arteries.  Small left vertebral artery with proximal occlusion of the left V4 segment that could be chronic. Patent left PICA that arises from the distal V3 segment.  No other large vessel occlusion.  Multifocal intracranial stenosis as above.    MRI brain without contrast, 11/26/2024:    IMPRESSION:  Motion-degraded examination including the DWI sequence limiting evaluation.     -Small area of acute to subacute infarct within the left lateral medulla.     -Additional artifact versus acute to subacute infarct within the left posterior frontal lobe.     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     -T2/FLAIR hyperintensities within the periventricular and deep white matter, nonspecific however distribution may be suggestive of demyelinating disease in the correct clinical setting. Differential includes precocious microangiopathic changes,   infectious/inflammatory process among other etiologies.    TTE, 11/26/2024:       Left Ventricle: Left ventricular cavity size is mildly dilated. Wall thickness is mildly increased. The left ventricular ejection fraction is 50-55%. Systolic function is low normal. Although no diagnostic regional wall motion abnormality was identified, this possibility cannot be completely excluded on  the basis of this study. Diastolic function is normal.    Right Ventricle: Right ventricular cavity size is dilated. Systolic function is normal.    Radiology Results Review: I have reviewed radiology reports from 11/26/2024 including: CT head, MRI brain, and Echocardiogram.    Administrative Statements   I have spent a total time of 40 minutes in caring for this patient on the day of the visit/encounter including Diagnostic results, Risks and benefits of tx options, Instructions for management, Patient and family education, Importance of tx compliance, Risk factor reductions, Impressions, Counseling / Coordination of care, Documenting in the medical record, Reviewing / ordering tests, medicine, procedures  , and Obtaining or reviewing history  .

## 2025-01-28 NOTE — ASSESSMENT & PLAN NOTE
- /70 today in the office  - Currently taking lisinopril 40 mg daily, hydrochlorothiazide 25 mg, diltiazem 120 mg daily, and amlodipine 5 mg daily for antihypertensive control  - Advised patient to follow-up with cardiology moving forward in regards to antihypertensive therapy and further evaluation of the patient's hypertension moving forward.    Orders:    Ambulatory Referral to Cardiology; Future

## 2025-02-26 NOTE — PROGRESS NOTES
"    New Patient Consult Note      Chief Complaint   Patient presents with    Diabetes Type 2      Referring Provider  Jay Gifford [facility]  [address] [place] [address]  [place] [id number]     History of Present Illness:   Ralph Flowers is a 49 y.o. male with a history of type 2 diabetes poorly controlled With complications of CVA ? 11/24 who presents today for diabetes management. Other  Pmhx of class 3 obesity, htn, hlp, hypothyroidism, afib- non compliant with medication.     Patient was first diagnosed with T2DM - 10 yrs ago.   Control since diagnosis:- poor  Medications tried thus far: reports has been on many other medications in past and reports has tried several others including janumet, novolin. Changes made by other doctors per patient report. No adverse reaction to any meds.   Current regime:- metformin 1000mg BID, Jardiance 25mg daily- just started.   BG control:- had logan before but doesn't have it right now.   Hypoglycemic episodes: None  Hyperglycemia symptoms: no polyuria or polydipsia\",\"no chest pain, dyspnea or TIAs\",\"no numbness, tingling or pain in extremities\"  Diet:- eats whatever he wants, no diabetic diet.   Activity:-  PT only.     Opthamology: None seen on chart, hasn't seen anyone in 3-4 years.  Hx of hyperlipidemia:Yes  Hx of hypertension: Yes  Last Lipid:- 11/24 LDL >100  Last urine microalbumin:11/24 normal  Last hbA1C: 11/24 12.7%    Social Hx:- no alcohol, no smoking, no drugs  Family HX:- everyone has diabetes.     Patient Active Problem List   Diagnosis    Essential hypertension    History of noncompliance with medical treatment    Hyperlipidemia    Hypothyroidism    Morbid (severe) obesity due to excess calories (HCC)    Obstructive sleep apnea    Paroxysmal atrial fibrillation (HCC)    Type 2 diabetes mellitus with hyperglycemia (HCC)    History of embolic stroke    Hypertensive urgency      Past Medical History:   Diagnosis Date    Atrial fibrillation (HCC)     Diabetes " mellitus (HCC)     Hyperlipidemia     Hypertension     Obesity     Stroke (HCC)       History reviewed. No pertinent surgical history.   History reviewed. No pertinent family history.  Social History     Tobacco Use    Smoking status: Never     Passive exposure: Never    Smokeless tobacco: Never   Substance Use Topics    Alcohol use: Not on file     Allergies   Allergen Reactions    Bee Pollen Hives     Reaction Date: 13Oct2011;          Current Outpatient Medications:     Alcohol Swabs 70 % PADS, May substitute brand based on insurance coverage. Check glucose TID., Disp: 100 each, Rfl: 0    Blood Glucose Monitoring Suppl (OneTouch Verio Reflect) w/Device KIT, May substitute brand based on insurance coverage. Check glucose TID., Disp: 1 kit, Rfl: 0    EPINEPHrine (EPIPEN) 0.3 mg/0.3 mL SOAJ, Inject as directed (Patient taking differently: Inject as directed as needed), Disp: , Rfl:     insulin degludec (Tresiba FlexTouch) 100 units/mL injection pen, Inject 45 Units under the skin daily, Disp: 15 mL, Rfl: 1    Insulin Pen Needle (BD Pen Needle Jacinta U/F) 32G X 4 MM MISC, Use in the morning, Disp: 50 each, Rfl: 1    Jardiance 25 MG TABS, Take 25 mg by mouth every morning, Disp: , Rfl:     metFORMIN (GLUCOPHAGE) 1000 MG tablet, Take 1 tablet (1,000 mg total) by mouth 2 (two) times a day with meals, Disp: 60 tablet, Rfl: 0    OneTouch Delica Lancets 33G MISC, May substitute brand based on insurance coverage. Check glucose TID., Disp: 100 each, Rfl: 0    Tirzepatide (Mounjaro) 2.5 MG/0.5ML SOAJ, Inject 2.5 mg under the skin once a week, Disp: 2 mL, Rfl: 0    Tirzepatide (Mounjaro) 5 MG/0.5ML SOAJ, Inject 5 mg under the skin once a week, Disp: 2 mL, Rfl: 3    amLODIPine (NORVASC) 5 mg tablet, Take 1 tablet (5 mg total) by mouth daily, Disp: 30 tablet, Rfl: 0    apixaban (Eliquis) 5 mg, Take 1 tablet (5 mg total) by mouth 2 (two) times a day, Disp: 60 tablet, Rfl: 0    aspirin (Aspirin 81) 81 mg EC tablet, Take 1 tablet (81  "mg total) by mouth daily, Disp: 30 tablet, Rfl: 0    atorvastatin (LIPITOR) 80 mg tablet, Take 1 tablet (80 mg total) by mouth daily, Disp: 30 tablet, Rfl: 0    diltiazem (CARDIZEM CD) 120 mg 24 hr capsule, Take 1 capsule (120 mg total) by mouth daily, Disp: 30 capsule, Rfl: 0    hydroCHLOROthiazide 25 mg tablet, Take 1 tablet (25 mg total) by mouth daily, Disp: 30 tablet, Rfl: 0    levothyroxine 50 mcg tablet, Take 1 tablet (50 mcg total) by mouth daily, Disp: 30 tablet, Rfl: 0    lisinopril (ZESTRIL) 40 mg tablet, Take 1 tablet (40 mg total) by mouth daily, Disp: 30 tablet, Rfl: 0    OneTouch Verio test strip, APPLY 1 EACH BY MISC.(NON-DRUG COMBO) ROUTE EVERY DAY, Disp: , Rfl:     polyethylene glycol (MIRALAX) 17 g packet, Take 17 g by mouth daily (Patient not taking: Reported on 2/27/2025), Disp: 510 g, Rfl: 0  Review of Systems   Constitutional:  Negative for diaphoresis, fatigue and unexpected weight change.   Respiratory:  Negative for shortness of breath.    Cardiovascular:  Negative for chest pain and palpitations.   Gastrointestinal:  Negative for constipation and diarrhea.   Endocrine: Negative for polydipsia and polyuria.       Physical Exam:  Body mass index is 38.8 kg/m².  /80   Pulse 70   Ht 5' 10\" (1.778 m)   Wt 123 kg (270 lb 6.4 oz)   SpO2 95%   BMI 38.80 kg/m²    Wt Readings from Last 3 Encounters:   02/27/25 123 kg (270 lb 6.4 oz)   01/28/25 126 kg (277 lb)   11/26/24 134 kg (296 lb)       Physical Exam  Constitutional:       Appearance: Normal appearance. He is obese.   Cardiovascular:      Rate and Rhythm: Normal rate and regular rhythm.      Pulses: Normal pulses. no weak pulses.           Dorsalis pedis pulses are 2+ on the right side and 2+ on the left side.   Pulmonary:      Effort: Pulmonary effort is normal.   Abdominal:      General: Abdomen is flat.      Palpations: Abdomen is soft.   Feet:      Right foot:      Skin integrity: No ulcer, skin breakdown, erythema, warmth, " callus or dry skin.      Left foot:      Skin integrity: No ulcer, skin breakdown, erythema, warmth, callus or dry skin.   Skin:     General: Skin is warm.      Capillary Refill: Capillary refill takes less than 2 seconds.   Neurological:      General: No focal deficit present.      Mental Status: He is alert.       Patient's shoes and socks removed.    Right Foot/Ankle   Right Foot Inspection  Skin Exam: skin normal and skin intact. No dry skin, no warmth, no callus, no erythema, no maceration, no abnormal color, no pre-ulcer, no ulcer and no callus.     Toe Exam: ROM and strength within normal limits.     Sensory   Vibration: intact  Monofilament testing: intact    Vascular  Capillary refills: < 3 seconds  The right DP pulse is 2+.     Left Foot/Ankle  Left Foot Inspection  Skin Exam: skin normal and skin intact. No dry skin, no warmth, no erythema, no maceration, normal color, no pre-ulcer, no ulcer and no callus.     Toe Exam: ROM and strength within normal limits.     Sensory   Vibration: intact  Monofilament testing: intact    Vascular  Capillary refills: < 3 seconds  The left DP pulse is 2+.     Assign Risk Category  No deformity present  No loss of protective sensation  No weak pulses  Risk: 0      Labs:    Latest Reference Range & Units 09/28/22 16:30 09/22/23 07:16 12/28/23 09:00 04/02/24 09:00 11/27/24 04:32   Hemoglobin A1C Normal 4.0-5.6%; PreDiabetic 5.7-6.4%; Diabetic >=6.5%; Glycemic control for adults with diabetes <7.0% %  Normal 4.0-5.6%; PreDiabetic 5.7-6.4%; Diabetic >=6.5%; Glycemic control for adults with diabetes <7.0% % 9.6 (H) (E) 12.6 (H) (E) 9.7 (H) (E) 8.3 (H) (E) 12.9 (H)  12.7 (H)   (H): Data is abnormally high  (E): External lab result     Latest Reference Range & Units 11/27/24 04:32   Cholesterol See Comment mg/dL 198   Triglycerides See Comment mg/dL 142   HDL >=40 mg/dL 38 (L)   LDL Calculated 0 - 100 mg/dL 132 (H)   (L): Data is abnormally low  (H): Data is abnormally high      Latest Reference Range & Units 09/28/22 16:30 04/02/24 09:00   EXT Creatinine Urine 50.0 - 200.0 mg/dL 77.4 (E) 94.1 (E)   Albumin Creat Ratio <30.0 mg/gm CREA 16.8 (E) 19.1 (E)   Albumin,U,Random <3.0 mg/dL 1.3 (E) 1.8 (E)   (E): External lab result      Impression:  1. Class 3 obesity    2. History of embolic stroke    3. Type 2 diabetes mellitus with hyperglycemia (HCC)    4. Essential hypertension           Plan:    Ralph was seen today for diabetes type 2.    Diagnoses and all orders for this visit:    Class 3 obesity  -     insulin degludec (Tresiba FlexTouch) 100 units/mL injection pen; Inject 45 Units under the skin daily  -     Tirzepatide (Mounjaro) 2.5 MG/0.5ML SOAJ; Inject 2.5 mg under the skin once a week  -     Tirzepatide (Mounjaro) 5 MG/0.5ML SOAJ; Inject 5 mg under the skin once a week  -     Insulin Pen Needle (BD Pen Needle Jacinta U/F) 32G X 4 MM MISC; Use in the morning  -     Ambulatory referral to Diabetic Education; Future    History of embolic stroke  -     Comprehensive metabolic panel; Future  -     Albumin / creatinine urine ratio; Future  -     Lipid panel; Future  -     POCT hemoglobin A1c  -     Ambulatory Referral to Endocrinology  -     insulin degludec (Tresiba FlexTouch) 100 units/mL injection pen; Inject 45 Units under the skin daily  -     Tirzepatide (Mounjaro) 2.5 MG/0.5ML SOAJ; Inject 2.5 mg under the skin once a week  -     Tirzepatide (Mounjaro) 5 MG/0.5ML SOAJ; Inject 5 mg under the skin once a week  -     Insulin Pen Needle (BD Pen Needle Jacinta U/F) 32G X 4 MM MISC; Use in the morning  -     Ambulatory referral to Diabetic Education; Future    Type 2 diabetes mellitus with hyperglycemia (HCC)  -     Comprehensive metabolic panel; Future  -     Albumin / creatinine urine ratio; Future  -     Lipid panel; Future  -     POCT hemoglobin A1c  -     Ambulatory Referral to Endocrinology  -     insulin degludec (Tresiba FlexTouch) 100 units/mL injection pen; Inject 45 Units under the  skin daily  -     Tirzepatide (Mounjaro) 2.5 MG/0.5ML SOAJ; Inject 2.5 mg under the skin once a week  -     Tirzepatide (Mounjaro) 5 MG/0.5ML SOAJ; Inject 5 mg under the skin once a week  -     Insulin Pen Needle (BD Pen Needle Jacinta U/F) 32G X 4 MM MISC; Use in the morning  -     Ambulatory referral to Diabetic Education; Future    Essential hypertension      There are no diagnoses linked to this encounter.      Patient is a 49yM With PMHx of T2DM since 10 yrs With complications of  CVA, neuropathy with Other PMHx of class 3 obesity, htn, hlp, hypothyroidism Who presents today for diabetic care.     1) T2DM:- POC A1C today 11.7% Discussed this is still very high and given his history of stroke very concerning. Discussed high risk for further CVD and CAD and also microvascular complications. Given all this and high A1C discussed metformin/jardiance  is not enough and needs to start insulin therapy right now. Given CVD also will start on mounjaro. This will also help with his weight. Discussed his diet also needs adjustment- will refer to CDE. Given on insulin now should get approved for CGM. Discussed can switch to metformin XR since feels this was working better.     Plan  - c/w metformin 2000mg XR  - C/w jardiance 25mg daily   - Will start on CGM  - Refer to CDE  - Start lantus 45u daily   - Start mounjaro 2.5mg weekly for 1 month and then 5mg weekly.       Screening:-   Retinopathy-   Nephropathy- Will order  Lipide levels- Will order    2) Hypertension:- BP in clinic 120/80, currently on lisinopril    3) Hyperlipidemia:- LDL previously above goal, will repeat and if still elevated consider medication changes/. C/w lipitor 80mg daily    RTC in 3 months    Discussed with the patient and all questioned fully answered. He will call me if any problems arise.    Counseled patient on diagnostic results, prognosis, risk and benefit of treatment options, instruction for management, importance of treatment compliance, Risk   factor reduction and impressions      Cindy Spivey MD

## 2025-02-27 ENCOUNTER — CONSULT (OUTPATIENT)
Dept: ENDOCRINOLOGY | Facility: HOSPITAL | Age: 50
End: 2025-02-27
Payer: COMMERCIAL

## 2025-02-27 VITALS
HEART RATE: 70 BPM | HEIGHT: 70 IN | OXYGEN SATURATION: 95 % | DIASTOLIC BLOOD PRESSURE: 80 MMHG | BODY MASS INDEX: 38.71 KG/M2 | SYSTOLIC BLOOD PRESSURE: 120 MMHG | WEIGHT: 270.4 LBS

## 2025-02-27 DIAGNOSIS — E11.65 TYPE 2 DIABETES MELLITUS WITH HYPERGLYCEMIA (HCC): ICD-10-CM

## 2025-02-27 DIAGNOSIS — E66.813 CLASS 3 OBESITY: Primary | ICD-10-CM

## 2025-02-27 DIAGNOSIS — Z86.73 HISTORY OF EMBOLIC STROKE: ICD-10-CM

## 2025-02-27 DIAGNOSIS — I10 ESSENTIAL HYPERTENSION: ICD-10-CM

## 2025-02-27 LAB — SL AMB POCT HEMOGLOBIN AIC: 11.7 (ref ?–6.5)

## 2025-02-27 PROCEDURE — 99204 OFFICE O/P NEW MOD 45 MIN: CPT | Performed by: STUDENT IN AN ORGANIZED HEALTH CARE EDUCATION/TRAINING PROGRAM

## 2025-02-27 PROCEDURE — 83036 HEMOGLOBIN GLYCOSYLATED A1C: CPT | Performed by: STUDENT IN AN ORGANIZED HEALTH CARE EDUCATION/TRAINING PROGRAM

## 2025-02-27 RX ORDER — PEN NEEDLE, DIABETIC 32GX 5/32"
NEEDLE, DISPOSABLE MISCELLANEOUS DAILY
Qty: 50 EACH | Refills: 1 | Status: SHIPPED | OUTPATIENT
Start: 2025-02-27 | End: 2025-02-27

## 2025-02-27 RX ORDER — TIRZEPATIDE 2.5 MG/.5ML
2.5 INJECTION, SOLUTION SUBCUTANEOUS WEEKLY
Qty: 2 ML | Refills: 0 | Status: SHIPPED | OUTPATIENT
Start: 2025-02-27 | End: 2025-02-27 | Stop reason: SDUPTHER

## 2025-02-27 RX ORDER — INSULIN DEGLUDEC 100 U/ML
45 INJECTION, SOLUTION SUBCUTANEOUS DAILY
Qty: 15 ML | Refills: 1 | Status: SHIPPED | OUTPATIENT
Start: 2025-02-27 | End: 2025-02-27

## 2025-02-27 RX ORDER — ACYCLOVIR 400 MG/1
1 TABLET ORAL
Qty: 10 EACH | Refills: 1 | Status: SHIPPED | OUTPATIENT
Start: 2025-02-27

## 2025-02-27 RX ORDER — INSULIN DEGLUDEC 100 U/ML
45 INJECTION, SOLUTION SUBCUTANEOUS DAILY
Qty: 15 ML | Refills: 1 | Status: SHIPPED | OUTPATIENT
Start: 2025-02-27

## 2025-02-27 RX ORDER — METFORMIN HYDROCHLORIDE 500 MG/1
2000 TABLET, EXTENDED RELEASE ORAL
Qty: 200 TABLET | Refills: 1 | Status: SHIPPED | OUTPATIENT
Start: 2025-02-27 | End: 2025-02-27

## 2025-02-27 RX ORDER — BLOOD SUGAR DIAGNOSTIC
STRIP MISCELLANEOUS
COMMUNITY
Start: 2024-12-09

## 2025-02-27 RX ORDER — TIRZEPATIDE 5 MG/.5ML
5 INJECTION, SOLUTION SUBCUTANEOUS WEEKLY
Qty: 2 ML | Refills: 3 | Status: SHIPPED | OUTPATIENT
Start: 2025-02-27

## 2025-02-27 RX ORDER — PEN NEEDLE, DIABETIC 32GX 5/32"
NEEDLE, DISPOSABLE MISCELLANEOUS DAILY
Qty: 50 EACH | Refills: 1 | Status: SHIPPED | OUTPATIENT
Start: 2025-02-27

## 2025-02-27 RX ORDER — METFORMIN HYDROCHLORIDE 500 MG/1
2000 TABLET, EXTENDED RELEASE ORAL
Qty: 200 TABLET | Refills: 1 | Status: SHIPPED | OUTPATIENT
Start: 2025-02-27

## 2025-02-27 RX ORDER — TIRZEPATIDE 2.5 MG/.5ML
2.5 INJECTION, SOLUTION SUBCUTANEOUS WEEKLY
Qty: 2 ML | Refills: 0 | Status: SHIPPED | OUTPATIENT
Start: 2025-02-27

## 2025-02-27 RX ORDER — TIRZEPATIDE 5 MG/.5ML
5 INJECTION, SOLUTION SUBCUTANEOUS WEEKLY
Qty: 2 ML | Refills: 3 | Status: SHIPPED | OUTPATIENT
Start: 2025-02-27 | End: 2025-02-27

## 2025-02-27 RX ORDER — ACYCLOVIR 400 MG/1
1 TABLET ORAL
Qty: 10 EACH | Refills: 1 | Status: SHIPPED | OUTPATIENT
Start: 2025-02-27 | End: 2025-02-27

## 2025-02-27 RX ORDER — EMPAGLIFLOZIN 25 MG/1
25 TABLET, FILM COATED ORAL EVERY MORNING
COMMUNITY
Start: 2025-01-30

## 2025-03-19 ENCOUNTER — TELEPHONE (OUTPATIENT)
Age: 50
End: 2025-03-19

## 2025-03-19 NOTE — TELEPHONE ENCOUNTER
03/19/25    [employer] called office today to check on the status of Medical Records Request faxed over 03/13/25.    Could not confirm if we received request, due to no documentation was found or scanned into patient chart.    Provided Medical Records Contact Number [phone], advised to reach out to their department, and have request re-fax.    Young lady from [employer] UNDERSTOOD and AGREED.       Any questions please contact [phone] or Patient.  Thank You.       NOTE:  Records being requested for Disability Claim.

## 2025-03-20 NOTE — TELEPHONE ENCOUNTER
Received via [employer] from [employer] request for medical records.  Request scanned into  and faxed to [employer].

## 2025-03-26 ENCOUNTER — CONSULT (OUTPATIENT)
Dept: CARDIOLOGY CLINIC | Facility: CLINIC | Age: 50
End: 2025-03-26
Payer: COMMERCIAL

## 2025-03-26 VITALS
SYSTOLIC BLOOD PRESSURE: 114 MMHG | HEART RATE: 100 BPM | BODY MASS INDEX: 39.17 KG/M2 | DIASTOLIC BLOOD PRESSURE: 82 MMHG | WEIGHT: 273.6 LBS | HEIGHT: 70 IN

## 2025-03-26 DIAGNOSIS — I48.0 PAROXYSMAL ATRIAL FIBRILLATION (HCC): ICD-10-CM

## 2025-03-26 DIAGNOSIS — Z86.73 HISTORY OF EMBOLIC STROKE: ICD-10-CM

## 2025-03-26 DIAGNOSIS — I10 ESSENTIAL HYPERTENSION: ICD-10-CM

## 2025-03-26 PROCEDURE — 99204 OFFICE O/P NEW MOD 45 MIN: CPT

## 2025-03-26 PROCEDURE — 93000 ELECTROCARDIOGRAM COMPLETE: CPT

## 2025-03-26 NOTE — PROGRESS NOTES
Cardiology Consultation   MD Juno Roy MD, FACC  Elijah Dubois DO, GOLDIE CASTRO FACP, FASNC Ather Mansoor, MD Rujul Patel, DO, [facility]  Pipe Kelsey DO, JACOB CASTRO  -------------------------------------------------------------------  [facility]  [address] [place] [address]  Phone: [phone]  Fax: [phone]  03/26/25  Ralph Flowers  YOB: 1975   MRN: [id number]      Referring Physician: Jay Gifford PA-C  [address] [place] [id number]     HPI:  I am seeing this patient in cardiology consultation for:  Afib    Ralph Flowers is a 49 y.o. male with:   Atrial Fibrillation  CVA due to embolism  HTN  T2DM - IDDM  Obesity  JAVON  HLD  Hypothyroidism  Medical non compliance    Tobacco: none  Alcohol: none  Drugs: none    Family History: DM, HTN, no specific fam hx of CAD    Echo Nov 2024  Left Ventricle: Left ventricular cavity size is mildly dilated. Wall thickness is mildly increased. The left ventricular ejection fraction is 50-55%. Systolic function is low normal. Although no diagnostic regional wall motion abnormality was identified, this possibility cannot be completely excluded on the basis of this study. Diastolic function is normal.    Right Ventricle: Right ventricular cavity size is dilated. Systolic function is normal.    He presents for evaluation with cardiology.  He has a history of A-fib which has been present now for many years.  He is on Eliquis for anticoagulation.  He presented to the hospital November 2024 with a new stroke however, thought to be secondary to possible A-fib.  He was off blood thinners at that time.  He has since followed up with neurology who sent him for consultation with cardiology given his history of A-fib.  Today he is in a sinus rhythm on electrocardiogram, has occasional PVCs.  He is on diltiazem to help with rate control.  He is also on amlodipine Eliquis now for  anticoagulation aspirin atorvastatin hydrochlorothiazide Jardiance lisinopril and tirzepatide    Review of Systems   Constitutional:  Negative for chills and fever.   HENT:  Negative for facial swelling and sore throat.    Eyes:  Negative for visual disturbance.   Respiratory:  Negative for cough, chest tightness, shortness of breath and wheezing.    Cardiovascular:  Negative for chest pain, palpitations and leg swelling.   Gastrointestinal:  Negative for abdominal pain, blood in stool, constipation, diarrhea, nausea and vomiting.   Endocrine: Negative for cold intolerance and heat intolerance.   Genitourinary:  Negative for decreased urine volume, difficulty urinating, dysuria and hematuria.   Musculoskeletal:  Positive for gait problem. Negative for arthralgias, back pain and myalgias.   Skin:  Negative for rash.   Neurological:  Negative for dizziness, syncope, weakness and numbness.   Psychiatric/Behavioral:  Negative for agitation, behavioral problems and confusion. The patient is not nervous/anxious.         OBJECTIVE  Vitals:    03/26/25 0850   BP: 114/82   Pulse: 100       Physical Exam   Constitutional: awake, alert and oriented, in no acute distress, no obvious deformities, obese male  Head: Normocephalic, without obvious abnormality, atraumatic  Eyes: conjunctivae clear and moist. Sclera anicteric. No xanthelasmas. Pupils equal bilaterally. Extraocular motions are full.  Ear nose mouth and throat: ears are symmetrical bilaterally, hearing appears to be equal bilaterally, no nasal discharge or epistaxis, oropharynx is clear with moist mucous membranes  Neck: Trachea is midline, neck is supple, no thyromegaly or significant lymphadenopathy, there is full range of motion.  Lungs: clear to auscultation bilaterally, no wheezes, no rales, no rhonchi, no accessory muscle use, breathing is nonlabored  Heart: Regular rhythm with a Normal heart rate, S1, S2 normal, No Murmur, no click, rub or gallop, No lower  extremity edema  Abdomen: Obese, soft, non-tender; bowel sounds normal; no masses, no organomegaly  Psychiatric: Patient is oriented to time, place, person, mood/affect is negative for depression, anxiety, agitation, appears to have appropriate insight  Skin: Skin is warm, dry, intact. No obvious rashes or lesions on exposed extremities. Nail beds are pink with no cyanosis or clubbing.      EKG:  Results for orders placed or performed in visit on 03/26/25   POCT ECG    Impression    Sinus rhythm with incomplete left bundle branch block        The ASCVD Risk score (Leigh PALACIO, et al., 2019) failed to calculate for the following reasons:    Risk score cannot be calculated because patient has a medical history suggesting prior/existing ASCVD    IMPRESSION:  Atrial Fibrillation  CVA due to embolism  HTN  T2DM - IDDM  Obesity  JAVON  HLD  Hypothyroidism  Medical non compliance    DISCUSSION/RECOMMENDATIONS:  He has been sent for evaluation with cardiology for A-fib  He had a stroke in November 2024 when off anticoagulation.  Now he is back on anticoagulation for A-fib with Eliquis.  He is in a sinus rhythm today in the office and his cardiac exam is unremarkable.  He had a echocardiogram in November 2024 that showed preserved ejection fraction of 50 to 55% without significant valvular heart disease.  Can hold off on repeat cardiac imaging for now.  He denies any angina sounding pain  His blood pressure is controlled today  Lipid panel was checked last year [facility] and his total cholesterol was 138 triglycerides 114 HDL 38 LDL 77.  Will continue with current dose of atorvastatin.  He examines to be euvolemic today.  Lungs are clear  His diabetes is uncontrolled, A1c in February 2025 - 11.7-he just saw endocrinology and was started on insulin and Mounjaro with this lab result      Pipe Kelsey DO, [facility], JACOB  --------------------------------------------------------------------------------  TREADMILL STRESS  No  results found for this or any previous visit.     ----------------------------------------------------------------------------------------------  NUCLEAR STRESS TEST: No results found for this or any previous visit.    No results found for this or any previous visit.      --------------------------------------------------------------------------------  CATH:  No results found for this or any previous visit.    --------------------------------------------------------------------------------  ECHO:   No results found for this or any previous visit.    No results found for this or any previous visit.    --------------------------------------------------------------------------------  HOLTER  No results found for this or any previous visit.    --------------------------------------------------------------------------------  CAROTIDS  No results found for this or any previous visit.       Diagnoses and all orders for this visit:    History of embolic stroke  -     Ambulatory Referral to Cardiology    Paroxysmal atrial fibrillation (HCC)  -     Ambulatory Referral to Cardiology  -     POCT ECG    Essential hypertension  -     Ambulatory Referral to Cardiology       ======================================================    Past Medical History:   Diagnosis Date    Atrial fibrillation (HCC)     Diabetes mellitus (HCC)     Hyperlipidemia     Hypertension     Obesity     Stroke (HCC)      No past surgical history on file.      Medications  Current Outpatient Medications   Medication Sig Dispense Refill    amLODIPine (NORVASC) 5 mg tablet Take 1 tablet (5 mg total) by mouth daily 30 tablet 0    apixaban (Eliquis) 5 mg Take 1 tablet (5 mg total) by mouth 2 (two) times a day 60 tablet 0    aspirin (Aspirin 81) 81 mg EC tablet Take 1 tablet (81 mg total) by mouth daily 30 tablet 0    atorvastatin (LIPITOR) 80 mg tablet Take 1 tablet (80 mg total) by mouth daily 30 tablet 0    diltiazem (CARDIZEM CD) 120 mg 24 hr capsule Take 1  capsule (120 mg total) by mouth daily 30 capsule 0    EPINEPHrine (EPIPEN) 0.3 mg/0.3 mL SOAJ Inject as directed      hydroCHLOROthiazide 25 mg tablet Take 1 tablet (25 mg total) by mouth daily 30 tablet 0    insulin degludec (Tresiba FlexTouch) 100 units/mL injection pen Inject 45 Units under the skin daily 15 mL 1    Jardiance 25 MG TABS Take 25 mg by mouth every morning      lisinopril (ZESTRIL) 40 mg tablet Take 1 tablet (40 mg total) by mouth daily 30 tablet 0    metFORMIN (GLUCOPHAGE-XR) 500 mg 24 hr tablet Take 4 tablets (2,000 mg total) by mouth daily with dinner 200 tablet 1    Alcohol Swabs 70 % PADS May substitute brand based on insurance coverage. Check glucose TID. 100 each 0    Blood Glucose Monitoring Suppl (OneTouch Verio Reflect) w/Device KIT May substitute brand based on insurance coverage. Check glucose TID. 1 kit 0    Continuous Glucose Sensor (Dexcom G7 Sensor) Use 1 Device every 10 days 10 each 1    Insulin Pen Needle (BD Pen Needle Jacinta U/F) 32G X 4 MM MISC Use in the morning 50 each 1    levothyroxine 50 mcg tablet Take 1 tablet (50 mcg total) by mouth daily 30 tablet 0    OneTouch Delica Lancets 33G MISC May substitute brand based on insurance coverage. Check glucose TID. 100 each 0    OneTouch Verio test strip APPLY 1 EACH BY [facility].(NON-DRUG COMBO) ROUTE EVERY DAY      polyethylene glycol (MIRALAX) 17 g packet Take 17 g by mouth daily (Patient not taking: Reported on 3/26/2025) 510 g 0    Tirzepatide (Mounjaro) 2.5 MG/0.5ML SOAJ Inject 2.5 mg under the skin once a week 2 mL 0    Tirzepatide (Mounjaro) 5 MG/0.5ML SOAJ Inject 5 mg under the skin once a week 2 mL 3     No current facility-administered medications for this visit.        Allergies   Allergen Reactions    Bee Pollen Hives     BEE STINGS  Reaction Date: 13Oct2011;        Social History     Socioeconomic History    Marital status:      Spouse name: Not on file    Number of children: Not on file    Years of education: Not on  "file    Highest education level: Not on file   Occupational History    Not on file   Tobacco Use    Smoking status: Never     Passive exposure: Never    Smokeless tobacco: Never   Vaping Use    Vaping status: Never Used   Substance and Sexual Activity    Alcohol use: Not on file    Drug use: Not on file    Sexual activity: Not on file   Other Topics Concern    Not on file   Social History Narrative    Not on file     Social Drivers of Health     Financial Resource Strain: Not on file   Food Insecurity: Not on file   Transportation Needs: Not on file   Physical Activity: Not on file   Stress: Not on file   Social Connections: Not on file   Intimate Partner Violence: Not on file   Housing Stability: Not on file        No family history on file.    Lab Results   Component Value Date    WBC 9.54 11/28/2024    HGB 15.3 11/28/2024    HCT 45.6 11/28/2024    MCV 86 11/28/2024     11/28/2024      Lab Results   Component Value Date    SODIUM 136 11/28/2024    K 3.6 11/28/2024     11/28/2024    CO2 23 11/28/2024    BUN 15 11/28/2024    CREATININE 0.93 11/28/2024    GLUC 132 11/28/2024    CALCIUM 9.2 11/28/2024      Lab Results   Component Value Date    HGBA1C 11.7 (A) 02/27/2025      No results found for: \"CHOL\"  Lab Results   Component Value Date    HDL 38 (L) 11/27/2024     Lab Results   Component Value Date    LDLCALC 132 (H) 11/27/2024     Lab Results   Component Value Date    TRIG 142 11/27/2024     No results found for: \"CHOLHDL\"   Lab Results   Component Value Date    INR 1.03 11/26/2024    INR 1.6 04/02/2024    INR 1.9 12/28/2023    PROTIME 14.2 11/26/2024          Patient Active Problem List    Diagnosis Date Noted    History of embolic stroke 11/26/2024    Hypertensive urgency 11/26/2024    History of noncompliance with medical treatment 12/20/2021    Morbid (severe) obesity due to excess calories (HCC) 04/21/2021    Obstructive sleep apnea 04/21/2021    Type 2 diabetes mellitus with hyperglycemia (HCC) " "04/21/2021    Hyperlipidemia 01/03/2018    Hypothyroidism 05/10/2017    Paroxysmal atrial fibrillation (HCC) 06/01/2011    Essential hypertension 05/11/2011       Portions of the record may have been created with voice recognition software. Occasional wrong word or \"sound a like\" substitutions may have occurred due to the inherent limitations of voice recognition software. Read the chart carefully and recognize, using context, where substitutions have occurred.    Pipe Kelsey DO, FACC  3/26/2025 9:13 AM          "

## 2025-04-24 DIAGNOSIS — E66.813 CLASS 3 OBESITY: ICD-10-CM

## 2025-04-24 DIAGNOSIS — E11.65 TYPE 2 DIABETES MELLITUS WITH HYPERGLYCEMIA (HCC): ICD-10-CM

## 2025-04-24 DIAGNOSIS — Z86.73 HISTORY OF EMBOLIC STROKE: ICD-10-CM

## 2025-04-24 RX ORDER — INSULIN DEGLUDEC 100 U/ML
45 INJECTION, SOLUTION SUBCUTANEOUS DAILY
Qty: 15 ML | Refills: 5 | Status: SHIPPED | OUTPATIENT
Start: 2025-04-24

## 2025-05-14 DIAGNOSIS — E11.65 TYPE 2 DIABETES MELLITUS WITH HYPERGLYCEMIA (HCC): ICD-10-CM

## 2025-05-14 RX ORDER — METFORMIN HYDROCHLORIDE 500 MG/1
2000 TABLET, EXTENDED RELEASE ORAL
Qty: 200 TABLET | Refills: 1 | Status: SHIPPED | OUTPATIENT
Start: 2025-05-14

## 2025-06-25 ENCOUNTER — OFFICE VISIT (OUTPATIENT)
Dept: NEUROLOGY | Facility: CLINIC | Age: 50
End: 2025-06-25

## 2025-06-25 VITALS
TEMPERATURE: 96.9 F | WEIGHT: 304.4 LBS | SYSTOLIC BLOOD PRESSURE: 138 MMHG | HEART RATE: 90 BPM | DIASTOLIC BLOOD PRESSURE: 80 MMHG | OXYGEN SATURATION: 97 % | BODY MASS INDEX: 43.68 KG/M2

## 2025-06-25 DIAGNOSIS — I10 ESSENTIAL HYPERTENSION: ICD-10-CM

## 2025-06-25 DIAGNOSIS — E11.65 TYPE 2 DIABETES MELLITUS WITH HYPERGLYCEMIA (HCC): ICD-10-CM

## 2025-06-25 DIAGNOSIS — G47.33 OBSTRUCTIVE SLEEP APNEA: ICD-10-CM

## 2025-06-25 DIAGNOSIS — I48.0 PAROXYSMAL ATRIAL FIBRILLATION (HCC): ICD-10-CM

## 2025-06-25 DIAGNOSIS — E78.5 HYPERLIPIDEMIA: ICD-10-CM

## 2025-06-25 DIAGNOSIS — Z86.73 HISTORY OF EMBOLIC STROKE: Primary | ICD-10-CM

## 2025-06-25 PROCEDURE — 99213 OFFICE O/P EST LOW 20 MIN: CPT

## 2025-06-25 NOTE — ASSESSMENT & PLAN NOTE
- Advised patient to continue to follow with cardiology in regards to further monitoring of paroxysmal atrial fibrillation continuation of anticoagulation with Eliquis 5 mg twice daily.  - Encouraged continuation of follow-up with endocrinology in regards to patient's type 2 diabetes mellitus.  Most recent hemoglobin A1c was 11.7%.  Advised patient to continue to work on medication management and lifestyle modifications to help with his diabetes mellitus at this point in time.  Still continue to follow with endocrinology as recommended.  - Ambulatory referral to sleep medicine to evaluate and rule out any concerns in regards to JAVON.  Patient reported previous history of JAVON in the past, does not currently use CPAP at this time.  - Complete lipid panel before next appointment to evaluate patient's cholesterol and make sure that the atorvastatin 80 mg daily is appropriate for him.    - For ongoing stroke prevention continue: Eliquis 5 mg twice daily, aspirin 81 mg once daily, atorvastatin 80 mg once daily    - Discussed the importance of antiplatelet/anticoagulation management with the patient to prevent future strokes.   - Recommend to check blood pressure occasionally away from the doctor's office to make sure that those numbers are typically less than 130/80.  If they are frequently higher than that, we recommend checking a little more often and to follow up with primary care team   - Will defer to primary care team for monitoring of cholesterol panel and blood sugar numbers with target LDL cholesterol of less than 70 and hemoglobin A1c less than 7%  - Recommend following a low salt, mediterranean diet   - Recommend routine physical exercise as tolerated

## 2025-06-25 NOTE — PROGRESS NOTES
Name: Ralph Flowers      : 1975      MRN: [id number]  Encounter Provider: Jay Gifford PA-C  Encounter Date: 2025   Encounter department: [facility]  :  Assessment & Plan  History of embolic stroke  - Advised patient to continue to follow with cardiology in regards to further monitoring of paroxysmal atrial fibrillation continuation of anticoagulation with Eliquis 5 mg twice daily.  - Encouraged continuation of follow-up with endocrinology in regards to patient's type 2 diabetes mellitus.  Most recent hemoglobin A1c was 11.7%.  Advised patient to continue to work on medication management and lifestyle modifications to help with his diabetes mellitus at this point in time.  Still continue to follow with endocrinology as recommended.  - Ambulatory referral to sleep medicine to evaluate and rule out any concerns in regards to JAVON.  Patient reported previous history of JAVON in the past, does not currently use CPAP at this time.  - Complete lipid panel before next appointment to evaluate patient's cholesterol and make sure that the atorvastatin 80 mg daily is appropriate for him.    - For ongoing stroke prevention continue: Eliquis 5 mg twice daily, aspirin 81 mg once daily, atorvastatin 80 mg once daily    - Discussed the importance of antiplatelet/anticoagulation management with the patient to prevent future strokes.   - Recommend to check blood pressure occasionally away from the doctor's office to make sure that those numbers are typically less than 130/80.  If they are frequently higher than that, we recommend checking a little more often and to follow up with primary care team   - Will defer to primary care team for monitoring of cholesterol panel and blood sugar numbers with target LDL cholesterol of less than 70 and hemoglobin A1c less than 7%  - Recommend following a low salt, mediterranean diet   - Recommend routine physical exercise as tolerated         Paroxysmal atrial fibrillation (HCC)         Essential hypertension         Hyperlipidemia         Type 2 diabetes mellitus with hyperglycemia (HCC)    Lab Results   Component Value Date    HGBA1C 11.7 (A) 02/27/2025            Obstructive sleep apnea           Patient Instructions   - Advised patient to continue to follow with cardiology in regards to further monitoring of paroxysmal atrial fibrillation continuation of anticoagulation with Eliquis 5 mg twice daily.  - Encouraged continuation of follow-up with endocrinology in regards to patient's type 2 diabetes mellitus.  Most recent hemoglobin A1c was 11.7%.  Advised patient to continue to work on medication management and lifestyle modifications to help with his diabetes mellitus at this point in time.  Still continue to follow with endocrinology as recommended.  - Ambulatory referral to sleep medicine to evaluate and rule out any concerns in regards to JAVON.  Patient reported previous history of JAVON in the past, does not currently use CPAP at this time.  - Complete lipid panel before next appointment to evaluate patient's cholesterol and make sure that the atorvastatin 80 mg daily is appropriate for him.    - For ongoing stroke prevention continue: Eliquis 5 mg twice daily, aspirin 81 mg once daily, atorvastatin 80 mg once daily    - Discussed the importance of antiplatelet/anticoagulation management with the patient to prevent future strokes.   - Recommend to check blood pressure occasionally away from the doctor's office to make sure that those numbers are typically less than 130/80.  If they are frequently higher than that, we recommend checking a little more often and to follow up with primary care team   - Will defer to primary care team for monitoring of cholesterol panel and blood sugar numbers with target LDL cholesterol of less than 70 and hemoglobin A1c less than 7%  - Recommend following a low salt, mediterranean diet   - Recommend routine  physical exercise as tolerated     We will plan for him to return to the office in 6 months time to see on of the APPs or Dr. Michael but would be happy to see him sooner if the need should arise.  If he has any symptoms concerning for TIA or stroke including sudden painless loss of vision or double vision, difficulty speaking or swallowing, vertigo/room spinning that does not quickly resolve, or weakness/numbness/loss of coordination affecting 1 side of the face or body he should proceed by ambulance to the nearest emergency room immediately.      History of Present Illness   HPI     For Review:    The patient was last seen in the office on 01/28/2025 by myself.  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  With additional artifact versus acute to subacute infarct of the left posterior frontal lobe.  Is noted that the patient stated since hospitalization has been doing well and not experiencing any new strokelike symptoms.  He noted that he was still having issues in regard to his gait and balance.  Sometimes veering to the left when he was walking.  He was taking Eliquis 5 mg twice daily for suspected strokes likely due to uncontrolled A-fib in the setting of warfarin noncompliance.  Now on anticoagulation with Eliquis and doing fine.  Taking aspirin 81 mg daily.  Due to significant intracranial stenosis/atherosclerotic disease, believe that is fine that the patient continues with aspirin.  Patient was to continue with atorvastatin 80 mg daily.  Patient did not have a cardiologist that he follows with for atrial fibrillation or for hypertension.  Placed ambulatory referral to cardiology.  Also placed ambulatory referral to endocrinology due to the fact the patient's most recent hemoglobin A1c was 12.7%.  Patient also noted to have history of sleep apnea but not currently utilizing CPAP.  Advised patient to  follow-up with ambulatory referral to sleep medicine as well for further evaluation.  Advised to complete lipid panel before next appointment to evaluate cholesterol and make sure atorvastatin 80 mg daily was appropriate.      Interval History:    Not having any new strokelike symptoms at this point in time.  The patient reports that he is still continuing to improve in regard to his deficits.  Still working his gait and balance which has improved.  Still taking Eliquis 5 mg twice daily for anticoagulation.  Taking aspirin 81 mg daily and atorvastatin 80 mg daily for secondary stroke prevention.  The patient's blood pressure in the office was 138/80.  LDL still 132 mg/dL.  Patient will complete lipid panel in the future he states.  Most recent point-of-care hemoglobin was 11.7%.  He does follow with endocrinology at this time and they are working to bring his sugars down.  Still continues to follow with cardiology as well at this point in time.    Review of Systems   Constitutional:  Negative for appetite change, fatigue and fever.   HENT: Negative.  Negative for hearing loss, tinnitus, trouble swallowing and voice change.    Eyes: Negative.  Negative for photophobia, pain and visual disturbance.   Respiratory: Negative.  Negative for shortness of breath.    Cardiovascular: Negative.  Negative for palpitations.   Gastrointestinal: Negative.  Negative for nausea and vomiting.   Endocrine: Negative.  Negative for cold intolerance.   Genitourinary: Negative.  Negative for dysuria, frequency and urgency.   Musculoskeletal:  Positive for gait problem. Negative for back pain, myalgias, neck pain and neck stiffness.   Skin: Negative.  Negative for rash.   Allergic/Immunologic: Negative.    Neurological:  Negative for dizziness, tremors, seizures, syncope, facial asymmetry, speech difficulty, weakness, light-headedness, numbness and headaches.   Hematological: Negative.  Does not bruise/bleed easily.    Psychiatric/Behavioral: Negative.  Negative for confusion, hallucinations and sleep disturbance.    All other systems reviewed and are negative.   I have personally reviewed the MA's review of systems and made changes as necessary.    Medical History Reviewed by provider this encounter:  Tobacco  Allergies  Meds  Problems  Med Hx  Surg Hx  Fam Hx     .  Past Medical History   Past Medical History[1]  Past Surgical History[2]  Family History[3]   reports that he has never smoked. He has never been exposed to tobacco smoke. He has never used smokeless tobacco.  Current Outpatient Medications   Medication Instructions    Alcohol Swabs 70 % PADS May substitute brand based on insurance coverage. Check glucose TID.    amLODIPine (NORVASC) 5 mg, Oral, Daily    apixaban (ELIQUIS) 5 mg, Oral, 2 times daily    atorvastatin (LIPITOR) 80 mg, Oral, Daily    Blood Glucose Monitoring Suppl (OneTouch Verio Reflect) w/Device KIT May substitute brand based on insurance coverage. Check glucose TID.    Continuous Glucose Sensor (Dexcom G7 Sensor) 1 Device, Does not apply, Every 10 days    diltiazem (CARDIZEM CD) 120 mg, Oral, Daily    EPINEPHrine (EPIPEN) 0.3 mg/0.3 mL SOAJ Inject as directed    hydroCHLOROthiazide 25 mg, Oral, Daily    Insulin Pen Needle (BD Pen Needle Jacinta U/F) 32G X 4 MM MISC Does not apply, Daily    Jardiance 25 mg, Every morning    levothyroxine 50 mcg, Oral, Daily    lisinopril (ZESTRIL) 40 mg, Oral, Daily    metFORMIN (GLUCOPHAGE-XR) 2,000 mg, Oral, Daily with dinner    Mounjaro 2.5 mg, Subcutaneous, Weekly    Mounjaro 5 mg, Subcutaneous, Weekly    OneTouch Delica Lancets 33G MISC May substitute brand based on insurance coverage. Check glucose TID.    OneTouch Verio test strip     polyethylene glycol (MIRALAX) 17 g, Oral, Daily    Tresiba FlexTouch 45 Units, Subcutaneous, Daily   Allergies[4]   Medications Ordered Prior to Encounter[5]   Social History[6]     Objective   There were no vitals taken for  this visit.    Physical Exam  Neurological Exam    Physical Exam:                                                                 Vitals:            Constitutional:    /80 (BP Location: Left arm, Patient Position: Sitting, Cuff Size: Large)   Pulse 90   Temp (!) 96.9 °F (36.1 °C) (Temporal)   Wt (!) 138 kg (304 lb 6.4 oz)   SpO2 97%   BMI 43.68 kg/m²   BP Readings from Last 3 Encounters:   06/25/25 138/80   03/26/25 114/82   02/27/25 120/80     Pulse Readings from Last 3 Encounters:   06/25/25 90   03/26/25 100   02/27/25 70         Well developed, well nourished, well groomed. No dysmorphic features.       Psychiatric:  Normal behavior and appropriate affect        Neurological Examination:     Mental status/cognitive function:   Orientated to time, place and person. Recent and remote memory intact. Attention span and concentration as well as fund of knowledge are appropriate for age. Normal language and spontaneous speech.    Cranial Nerves:  II-visual fields full.   III, IV, VI-Pupils were equal, round, and reactive to light and accomodation. Extraocular movements were full and conjugate without nystagmus. Conjugate gaze, normal smooth pursuits, normal saccades   V-facial sensation symmetric.    VII-facial expression symmetric, intact forehead wrinkle, strong eye closure, symmetric smile    VIII-hearing grossly intact bilaterally   IX, X-palate elevation symmetric, no dysarthria.   XI-shoulder shrug strength intact    XII-tongue protrusion midline.    Motor Exam: symmetric bulk and tone throughout, no pronator drift. Power/strength 5/5 bilateral upper and lower extremities, no atrophy, fasciculations or abnormal movements noted.   Sensory: grossly intact light touch in all extremities.   Reflexes: brachioradialis 1+, biceps 1+, knee 1+ bilaterally   Coordination: Finger nose finger intact bilaterally, no apparent dysmetria, ataxia or tremor noted  Gait: Currently using a cane to ambulate, unsteady gait  does veer to the left when walking     Administrative Statements   I have spent a total time of 20 minutes in caring for this patient on the day of the visit/encounter including Risks and benefits of tx options, Instructions for management, Patient and family education, Importance of tx compliance, Risk factor reductions, Impressions, Counseling / Coordination of care, Documenting in the medical record, Reviewing/placing orders in the medical record (including tests, medications, and/or procedures), and Obtaining or reviewing history  .       [1]   Past Medical History:  Diagnosis Date    Atrial fibrillation (HCC)     Diabetes mellitus (HCC)     Hyperlipidemia     Hypertension     Obesity     Stroke (HCC)    [2] No past surgical history on file.  [3] No family history on file.  [4]   Allergies  Allergen Reactions    Bee Pollen Hives     BEE STINGS  Reaction Date: 13Oct2011;    [5]   Current Outpatient Medications on File Prior to Visit   Medication Sig Dispense Refill    Alcohol Swabs 70 % PADS May substitute brand based on insurance coverage. Check glucose TID. 100 each 0    amLODIPine (NORVASC) 5 mg tablet Take 1 tablet (5 mg total) by mouth daily 30 tablet 0    apixaban (Eliquis) 5 mg Take 1 tablet (5 mg total) by mouth 2 (two) times a day 60 tablet 0    atorvastatin (LIPITOR) 80 mg tablet Take 1 tablet (80 mg total) by mouth daily 30 tablet 0    Blood Glucose Monitoring Suppl (OneTouch Verio Reflect) w/Device KIT May substitute brand based on insurance coverage. Check glucose TID. 1 kit 0    Continuous Glucose Sensor (Dexcom G7 Sensor) Use 1 Device every 10 days 10 each 1    diltiazem (CARDIZEM CD) 120 mg 24 hr capsule Take 1 capsule (120 mg total) by mouth daily 30 capsule 0    hydroCHLOROthiazide 25 mg tablet Take 1 tablet (25 mg total) by mouth daily 30 tablet 0    insulin degludec (Tresiba FlexTouch) 100 units/mL injection pen INJECT 45 UNITS UNDER THE SKIN DAILY 15 mL 5    Jardiance 25 MG TABS Take 25 mg by  mouth every morning      levothyroxine 50 mcg tablet Take 1 tablet (50 mcg total) by mouth daily 30 tablet 0    lisinopril (ZESTRIL) 40 mg tablet Take 1 tablet (40 mg total) by mouth daily 30 tablet 0    metFORMIN (GLUCOPHAGE-XR) 500 mg 24 hr tablet TAKE 4 TABLETS (2,000 MG TOTAL) BY MOUTH DAILY WITH DINNER 200 tablet 1    OneTouch Delica Lancets 33G MISC May substitute brand based on insurance coverage. Check glucose TID. 100 each 0    OneTouch Verio test strip       EPINEPHrine (EPIPEN) 0.3 mg/0.3 mL SOAJ Inject as directed (Patient not taking: Reported on 6/25/2025)      Insulin Pen Needle (BD Pen Needle Jacinta U/F) 32G X 4 MM MISC Use in the morning (Patient not taking: Reported on 6/25/2025) 50 each 1    polyethylene glycol (MIRALAX) 17 g packet Take 17 g by mouth daily (Patient not taking: Reported on 2/27/2025) 510 g 0    Tirzepatide (Mounjaro) 2.5 MG/0.5ML SOAJ Inject 2.5 mg under the skin once a week (Patient not taking: Reported on 6/25/2025) 2 mL 0    Tirzepatide (Mounjaro) 5 MG/0.5ML SOAJ Inject 5 mg under the skin once a week (Patient not taking: Reported on 6/25/2025) 2 mL 3     No current facility-administered medications on file prior to visit.   [6]   Social History  Tobacco Use    Smoking status: Never     Passive exposure: Never    Smokeless tobacco: Never   Vaping Use    Vaping status: Never Used

## 2025-06-25 NOTE — PATIENT INSTRUCTIONS
- Advised patient to continue to follow with cardiology in regards to further monitoring of paroxysmal atrial fibrillation continuation of anticoagulation with Eliquis 5 mg twice daily.  - Encouraged continuation of follow-up with endocrinology in regards to patient's type 2 diabetes mellitus.  Most recent hemoglobin A1c was 11.7%.  Advised patient to continue to work on medication management and lifestyle modifications to help with his diabetes mellitus at this point in time.  Still continue to follow with endocrinology as recommended.  - Ambulatory referral to sleep medicine to evaluate and rule out any concerns in regards to JAVON.  Patient reported previous history of JAVON in the past, does not currently use CPAP at this time.  - Complete lipid panel before next appointment to evaluate patient's cholesterol and make sure that the atorvastatin 80 mg daily is appropriate for him.    - For ongoing stroke prevention continue: Eliquis 5 mg twice daily, aspirin 81 mg once daily, atorvastatin 80 mg once daily    - Discussed the importance of antiplatelet/anticoagulation management with the patient to prevent future strokes.   - Recommend to check blood pressure occasionally away from the doctor's office to make sure that those numbers are typically less than 130/80.  If they are frequently higher than that, we recommend checking a little more often and to follow up with primary care team   - Will defer to primary care team for monitoring of cholesterol panel and blood sugar numbers with target LDL cholesterol of less than 70 and hemoglobin A1c less than 7%  - Recommend following a low salt, mediterranean diet   - Recommend routine physical exercise as tolerated     We will plan for him to return to the office in 6 months time to see on of the APPs or Dr. Michael but would be happy to see him sooner if the need should arise.  If he has any symptoms concerning for TIA or stroke including sudden painless loss of vision or  double vision, difficulty speaking or swallowing, vertigo/room spinning that does not quickly resolve, or weakness/numbness/loss of coordination affecting 1 side of the face or body he should proceed by ambulance to the nearest emergency room immediately.

## 2025-08-21 DIAGNOSIS — E66.813 CLASS 3 OBESITY: ICD-10-CM

## 2025-08-21 DIAGNOSIS — E11.65 TYPE 2 DIABETES MELLITUS WITH HYPERGLYCEMIA (HCC): ICD-10-CM

## 2025-08-21 DIAGNOSIS — Z86.73 HISTORY OF EMBOLIC STROKE: ICD-10-CM

## 2025-08-21 RX ORDER — PEN NEEDLE, DIABETIC 32GX 5/32"
NEEDLE, DISPOSABLE MISCELLANEOUS
Qty: 100 EACH | Refills: 1 | Status: SHIPPED | OUTPATIENT
Start: 2025-08-21

## 2025-08-22 DIAGNOSIS — E11.65 TYPE 2 DIABETES MELLITUS WITH HYPERGLYCEMIA (HCC): ICD-10-CM

## 2025-08-22 RX ORDER — ACYCLOVIR 400 MG/1
1 TABLET ORAL
Qty: 3 EACH | Refills: 2 | Status: SHIPPED | OUTPATIENT
Start: 2025-08-22